# Patient Record
Sex: FEMALE | Race: WHITE | NOT HISPANIC OR LATINO | URBAN - METROPOLITAN AREA
[De-identification: names, ages, dates, MRNs, and addresses within clinical notes are randomized per-mention and may not be internally consistent; named-entity substitution may affect disease eponyms.]

---

## 2017-11-06 ENCOUNTER — ALLSCRIPTS OFFICE VISIT (OUTPATIENT)
Dept: OTHER | Facility: OTHER | Age: 67
End: 2017-11-06

## 2017-11-06 DIAGNOSIS — K13.70 LESION OF ORAL MUCOSA: ICD-10-CM

## 2017-11-06 DIAGNOSIS — L60.2 ONYCHOGRYPHOSIS: ICD-10-CM

## 2017-11-06 DIAGNOSIS — Z00.00 ENCOUNTER FOR GENERAL ADULT MEDICAL EXAMINATION WITHOUT ABNORMAL FINDINGS: ICD-10-CM

## 2017-11-06 DIAGNOSIS — Z98.84 BARIATRIC SURGERY STATUS: ICD-10-CM

## 2017-11-07 ENCOUNTER — TRANSCRIBE ORDERS (OUTPATIENT)
Dept: LAB | Facility: CLINIC | Age: 67
End: 2017-11-07

## 2017-11-07 ENCOUNTER — APPOINTMENT (OUTPATIENT)
Dept: LAB | Facility: CLINIC | Age: 67
End: 2017-11-07

## 2017-11-07 DIAGNOSIS — Z98.84 BARIATRIC SURGERY STATUS: ICD-10-CM

## 2017-11-07 DIAGNOSIS — K13.70 LESION OF ORAL MUCOSA: ICD-10-CM

## 2017-11-07 DIAGNOSIS — Z00.00 ENCOUNTER FOR GENERAL ADULT MEDICAL EXAMINATION WITHOUT ABNORMAL FINDINGS: ICD-10-CM

## 2017-11-07 DIAGNOSIS — L60.2 ONYCHOGRYPHOSIS: ICD-10-CM

## 2017-11-07 LAB
25(OH)D3 SERPL-MCNC: 31.8 NG/ML (ref 30–100)
ALBUMIN SERPL BCP-MCNC: 3.9 G/DL (ref 3.5–5)
ALP SERPL-CCNC: 90 U/L (ref 46–116)
ALT SERPL W P-5'-P-CCNC: 28 U/L (ref 12–78)
ANION GAP SERPL CALCULATED.3IONS-SCNC: 7 MMOL/L (ref 4–13)
AST SERPL W P-5'-P-CCNC: 20 U/L (ref 5–45)
BILIRUB SERPL-MCNC: 0.61 MG/DL (ref 0.2–1)
BUN SERPL-MCNC: 9 MG/DL (ref 5–25)
CALCIUM SERPL-MCNC: 9.1 MG/DL (ref 8.3–10.1)
CHLORIDE SERPL-SCNC: 108 MMOL/L (ref 100–108)
CHOLEST SERPL-MCNC: 189 MG/DL (ref 50–200)
CO2 SERPL-SCNC: 29 MMOL/L (ref 21–32)
CREAT SERPL-MCNC: 0.64 MG/DL (ref 0.6–1.3)
ERYTHROCYTE [DISTWIDTH] IN BLOOD BY AUTOMATED COUNT: 15.1 % (ref 11.6–15.1)
GFR SERPL CREATININE-BSD FRML MDRD: 92 ML/MIN/1.73SQ M
GLUCOSE P FAST SERPL-MCNC: 95 MG/DL (ref 65–99)
HCT VFR BLD AUTO: 38.8 % (ref 34.8–46.1)
HDLC SERPL-MCNC: 65 MG/DL (ref 40–60)
HGB BLD-MCNC: 12.6 G/DL (ref 11.5–15.4)
LDLC SERPL CALC-MCNC: 101 MG/DL (ref 0–100)
MCH RBC QN AUTO: 29.9 PG (ref 26.8–34.3)
MCHC RBC AUTO-ENTMCNC: 32.5 G/DL (ref 31.4–37.4)
MCV RBC AUTO: 92 FL (ref 82–98)
PLATELET # BLD AUTO: 283 THOUSANDS/UL (ref 149–390)
PMV BLD AUTO: 10.5 FL (ref 8.9–12.7)
POTASSIUM SERPL-SCNC: 4.5 MMOL/L (ref 3.5–5.3)
PROT SERPL-MCNC: 7.7 G/DL (ref 6.4–8.2)
RBC # BLD AUTO: 4.22 MILLION/UL (ref 3.81–5.12)
SODIUM SERPL-SCNC: 144 MMOL/L (ref 136–145)
TRIGL SERPL-MCNC: 114 MG/DL
TSH SERPL DL<=0.05 MIU/L-ACNC: 2.14 UIU/ML (ref 0.36–3.74)
VIT B12 SERPL-MCNC: 252 PG/ML (ref 100–900)
WBC # BLD AUTO: 6.09 THOUSAND/UL (ref 4.31–10.16)

## 2017-11-07 PROCEDURE — 85027 COMPLETE CBC AUTOMATED: CPT

## 2017-11-07 PROCEDURE — 80061 LIPID PANEL: CPT

## 2017-11-07 PROCEDURE — 82607 VITAMIN B-12: CPT

## 2017-11-07 PROCEDURE — 82306 VITAMIN D 25 HYDROXY: CPT

## 2017-11-07 PROCEDURE — 83036 HEMOGLOBIN GLYCOSYLATED A1C: CPT

## 2017-11-07 PROCEDURE — 36415 COLL VENOUS BLD VENIPUNCTURE: CPT

## 2017-11-07 PROCEDURE — 84443 ASSAY THYROID STIM HORMONE: CPT

## 2017-11-07 PROCEDURE — 80053 COMPREHEN METABOLIC PANEL: CPT

## 2017-11-08 LAB
EST. AVERAGE GLUCOSE BLD GHB EST-MCNC: 128 MG/DL
HBA1C MFR BLD: 6.1 % (ref 4.2–6.3)

## 2017-11-08 NOTE — PROGRESS NOTES
Assessment  1  Oral lesion (528 9) (K13 70)   2  Nail hypertrophy (703 8) (L60 2)   3  Encounter for preventive health examination (V70 0) (Z00 00)   4  S/P bariatric surgery (V45 86) (Z98 84)    Plan  Health Maintenance, Nail hypertrophy, Oral lesion, S/P bariatric surgery    · (1) COMPREHENSIVE METABOLIC PANEL; Status:Active; Requested NRC:27MVU5441;    · (1) HEMOGLOBIN A1C; Status:Active; Requested BTG:97PDH0574;    · (1) LIPID PANEL FASTING W DIRECT LDL REFLEX; Status:Active; Requested  CJO:99WLJ7972;    · (1) TSH; Status:Active; Requested WUM:46OMR1457;    · (1) VITAMIN B12; Status:Active; Requested FYI:38WCO3545;    · (1) VITAMIN D 25-HYDROXY; Status:Active; Requested JYC:52TDX7010;   Nail hypertrophy, Oral lesion    · (1) CBC/ PLT (NO DIFF); Status:Active; Requested FZI:88AJK1740;     Chief Complaint  Pt here for a lesion on the L side of the inner cheek for a year now, and spreading  History of Present Illness  HM, Adult Female:   Social History: Household members include self  She is   Work status: working full time-- and-- occupation: [employer]  The patient has never smoked cigarettes  She reports rare alcohol use  She has never used illicit drugs  General Health: The patient's health since the last visit is described as fair  She does not have regular dental visits  The patient bridge  -- She complains of vision problems  Vision care includes wearing reading glasses-- and-- an eye examination more than a year ago  Lifestyle:  She consumes a diverse and healthy diet  She is on a diet and is generally adherent  -- She does not exercise regularly  Reproductive health: the patient is postmenopausal--   she reports normal menses  -- she is not sexually active  -- pregnancy history: G P 3    Screening:      Review of Systems    ROS reviewed  Past Medical History  1  History of polycystic ovarian syndrome (V13 29) (Z87 42)  Active Problems And Past Medical History Reviewed:    The active problems and past medical history were reviewed and updated today  Family History  Mother    1  Family history of Diabetes Mellitus (V18 0)  Family History    2  Family history of Diabetes Mellitus (V18 0)   3  Family history of Hepatitis  Family History Reviewed: The family history was reviewed and updated today  Social History   · Denied: History of Alcohol   · Denied: History of Drug Use   · Former smoker (V15 82) (O86 146)  The social history was reviewed and updated today  Surgical History  1  History of Gastric Surgery For Morbid Obesity Bypass With Steph-en-Y  Surgical History Reviewed: The surgical history was reviewed and updated today  Current Meds   1  Co Q 10 10 MG Oral Capsule; take 1 capsule daily; Therapy: (Recorded:06Nov2017) to Recorded   2  Curcumin Powder; USE AS DIRECTED; Therapy: 20LFN0120 to (Last Rx:07Usi5479) Ordered   3  Hair/Skin/Nails TABS; TAKE 1 TABLET DAILY; Therapy: [phone]) to Recorded   4  L-Carnitine 250 MG Oral Tablet; TAKE AS DIRECTED; Therapy: 77EIV4798 to (Last Rx:78Ibd8129) Ordered   5  Multiple Vitamins Oral Tablet; Take 1 daily; Therapy: 19VCS7267 to (Last Rx:00Cwg1355) Ordered   6  Multiple Vitamins Oral Tablet; TAKE 1 TABLET BY MOUTH ONCE DAILY; Therapy: 64KQE7359 to (Last Rx:03Tio8376) Ordered   7  Vitamin D3 2000 UNIT Oral Tablet; Take one tablet daily; Therapy: 50XBJ6927 to (Last Rx:26Xcc8232) Ordered    The medication list was reviewed and updated today  Allergies  1  Fish-derived Products   2  Penicillins   3   [employer] [facility]    Vitals   Recorded: [id number] 01:10PM Recorded: 61IUY4595 77:39JP   Systolic 133    Diastolic 78    Height  5 ft 2 in   Weight  183 lb 6 oz   BMI Calculated  33 54   BSA Calculated  1 84     Signatures   Electronically signed by : Gil Rodriguez DO; Nov 7 8648 11:57AM EST                       (Author)

## 2017-12-11 ENCOUNTER — ALLSCRIPTS OFFICE VISIT (OUTPATIENT)
Dept: OTHER | Facility: OTHER | Age: 67
End: 2017-12-11

## 2017-12-11 DIAGNOSIS — R19.5 OTHER FECAL ABNORMALITIES: ICD-10-CM

## 2017-12-11 DIAGNOSIS — Z12.11 ENCOUNTER FOR SCREENING FOR MALIGNANT NEOPLASM OF COLON: ICD-10-CM

## 2017-12-11 PROCEDURE — G0145 SCR C/V CYTO,THINLAYER,RESCR: HCPCS | Performed by: FAMILY MEDICINE

## 2017-12-12 ENCOUNTER — LAB REQUISITION (OUTPATIENT)
Dept: LAB | Facility: HOSPITAL | Age: 67
End: 2017-12-12
Payer: MEDICARE

## 2017-12-12 DIAGNOSIS — Z01.419 ENCOUNTER FOR GYNECOLOGICAL EXAMINATION WITHOUT ABNORMAL FINDING: ICD-10-CM

## 2017-12-12 DIAGNOSIS — Z78.0 ASYMPTOMATIC MENOPAUSAL STATE: ICD-10-CM

## 2017-12-18 LAB
LAB AP GYN PRIMARY INTERPRETATION: NORMAL
Lab: NORMAL

## 2017-12-28 ENCOUNTER — APPOINTMENT (OUTPATIENT)
Dept: LAB | Facility: CLINIC | Age: 67
End: 2017-12-28
Payer: MEDICARE

## 2017-12-28 ENCOUNTER — TRANSCRIBE ORDERS (OUTPATIENT)
Dept: LAB | Facility: CLINIC | Age: 67
End: 2017-12-28

## 2017-12-28 DIAGNOSIS — Z12.11 ENCOUNTER FOR SCREENING FOR MALIGNANT NEOPLASM OF COLON: ICD-10-CM

## 2017-12-28 LAB — HEMOCCULT STL QL IA: POSITIVE

## 2017-12-28 PROCEDURE — G0328 FECAL BLOOD SCRN IMMUNOASSAY: HCPCS

## 2018-01-15 VITALS
SYSTOLIC BLOOD PRESSURE: 140 MMHG | WEIGHT: 183.38 LBS | HEIGHT: 62 IN | BODY MASS INDEX: 33.75 KG/M2 | DIASTOLIC BLOOD PRESSURE: 78 MMHG

## 2018-01-23 VITALS
HEIGHT: 61 IN | DIASTOLIC BLOOD PRESSURE: 72 MMHG | BODY MASS INDEX: 35.14 KG/M2 | WEIGHT: 186.13 LBS | SYSTOLIC BLOOD PRESSURE: 112 MMHG

## 2018-01-23 NOTE — PROGRESS NOTES
Assessment    1  Screen for colon cancer (V76 51) (Z12 11)   2  Encounter for preventive health examination (V70 0) (Z00 00)   3  Pre-diabetes (790 29) (R73 03)   4  S/P bariatric surgery (V45 86) (Z98 84)   5  Postmenopausal (V49 81) (Z78 0)   6  Encounter for gynecological examination with Papanicolaou smear of cervix (V72 31)   (Z01 419)    Plan  Encounter for gynecological examination with Papanicolaou smear of cervix,  Postmenopausal    · (1) THIN PREP PAP WITH IMAGING; Status: In Progress - Specimen/Data Collected;    Done: 06FCV9571  Maturation index required? : No  : Postmenopausal  HPV? : if ASCUS  Pre-diabetes    · Begin or continue regular aerobic exercise  Gradually work up to at least 3 sessions of 30  minutes of exercise a week ; Status:Complete;   Done: 83BGP7004   · Continue with our present treatment plan ; Status:Complete;   Done: 60IAD0852   · We want you to follow the Therapeutic Lifestyle Changes (TLC) diet ; Status:Complete;    Done: 94RJC6043   · Follow-up visit in 1 year Evaluation and Treatment  Follow-up  Status: Complete  Done:  65LIO1020  Screen for colon cancer    · (1) OCCULT BLOOD, FECAL IMMUNOCHEMICAL TEST; Status:Active; Requested  for:84Hvp1726;     Discussion/Summary  Impression: Initial Annual Wellness Visit, with preventive exam as well as age and risk appropriate counseling completed  Cardiovascular screening and counseling: screening is current  Diabetes screening and counseling: screening is current  Colorectal cancer screening and counseling: due for fecal occult blood testing and fobt kit given  Breast cancer screening and counseling: the patient declines screening  Cervical cancer screening and counseling: screening is current  Osteoporosis screening and counseling: the patient declines screening  Abdominal aortic aneurysm screening and counseling: the risks and benefits of screening were discussed  Glaucoma screening and counseling: screening is current  Hepatitis C Screening: the patient was counseled on Hepatitis C screening  The patient declines Hepatitis C screening  Immunizations: the patient declines the influenza vaccination, the patient declines the pneumococcal vaccination, the patient declines the Zostavax vaccine and the patient declines the Tdap vaccine  Advance Directive Planning: complete and up to date  Patient Discussion: plan discussed with the patient, follow-up visit needed in one year  Chief Complaint  Medicare wellness and PAP      History of Present Illness  HPI: Patient is here for Medicare wellness as well as gyn exam   Lab work is reviewed and scanned into the chart all is within normal except for low vitamin B12  She needs to  new B12 supplementation  She also has a hemoglobin A1c of 6 1 percent this is improved from pre gastric bypass surgery of 6 9 percent  Welcome to [employer] and Wellness Visits: The patient is being seen for the initial annual wellness visit  Medicare Screening and Risk Factors   Hospitalizations: no previous hospitalizations  Medicare Screening Tests Risk Questions   Drug and Alcohol Use: The patient has never smoked cigarettes and has never used smokeless tobacco  The patient reports rare alcohol use  Alcohol concern:   The patient has no concerns about alcohol abuse  She has never used illicit drugs  Diet and Physical Activity: Current diet includes well balanced meals and low carbohydrate food choices  She exercises daily  Mood Disorder and Cognitive Impairment Screening: She denies feeling down, depressed, or hopeless over the past two weeks  She denies feeling little interest or pleasure in doing things over the past two weeks     Cognitive impairment screening: denies difficulty learning/retaining new information, denies difficulty handling complex tasks, denies difficulty with reasoning, denies difficulty with spatial ability and orientation, denies difficulty with language and denies difficulty with behavior  Functional Ability/Level of Safety: Hearing is normal bilaterally, normal in the right ear, normal in the left ear and Tinnitus  She denies hearing difficulties  She does not use a hearing aid  Activities of daily living details: does not need help using the phone, no transportation help needed, does not need help shopping, no meal preparation help needed, does not need help doing housework, does not need help doing laundry, does not need help managing medications and does not need help managing money  Fall risk factors: The patient fell 0 times in the past 12 months  Home safety risk factors:  loose rugs, but no unfamiliar surroundings, no poor household lighting, no uneven floors, no household clutter, grab bars in the bathroom and handrails on the stairs  Advance Directives: Advance directives: living will, durable power of  for health care directives and advance directives  Co-Managers and Medical Equipment/Suppliers: See Patient Care Team   Preventive Quality Program 72 and Older: Urinary Incontinence Symptoms includes: urinary incontinence    Date of last glaucoma screen was 2-3 years ago      Review of Systems    Constitutional: no fever  Eyes: no eye pain  ENT: no earache and no sneezing  Cardiovascular: the heart is not racing  Respiratory: no shortness of breath  Gastrointestinal: no abdominal pain and no abdominal bloating  Genitourinary: no urinary frequency  Integumentary and Breasts: no rashes  Neurological: no headache  Psychiatric: no insomnia  Active Problems    1  Pre-diabetes (790 29) (R73 03)   2  S/P bariatric surgery (V45 86) (S6 76)    Past Medical History    · History of polycystic ovarian syndrome (V13 29) (Z79 42)    The active problems and past medical history were reviewed and updated today        Surgical History    · History of Gastric Surgery For Morbid Obesity Bypass With Steph-en-Y    The surgical history was reviewed and updated today  Family History  Mother    · Family history of Diabetes Mellitus (V18 0)  Family History    · Family history of Diabetes Mellitus (V18 0)   · Family history of Hepatitis    The family history was reviewed and updated today  Social History    · Denied: History of Alcohol   · Denied: History of Drug Use   · Former smoker (V15 82) (Y02 749)  The social history was reviewed and updated today  Current Meds   1  Co Q 10 10 MG Oral Capsule; take 1 capsule daily; Therapy: (Recorded:06Nov2017) to Recorded   2  Curcumin Powder; USE AS DIRECTED; Therapy: 22LWI3810 to (Last Rx:51Bno4876) Ordered   3  Hair/Skin/Nails TABS; TAKE 1 TABLET DAILY; Therapy: [phone]) to Recorded   4  L-Carnitine 250 MG Oral Tablet; TAKE AS DIRECTED; Therapy: 82HUK9279 to (Last Rx:12Cgu3523) Ordered   5  Multiple Vitamins Oral Tablet; Take 1 daily; Therapy: 97DXX9428 to (Last Rx:20Jfd3238) Ordered   6  Multiple Vitamins Oral Tablet; TAKE 1 TABLET BY MOUTH ONCE DAILY; Therapy: 55UDE6317 to (Last Rx:06Ppm0632) Ordered   7  Vitamin D3 2000 UNIT Oral Tablet; Take one tablet daily; Therapy: 46TBK4879 to (Last Rx:42Ril8326) Ordered    The medication list was reviewed and updated today  Allergies    1  Fish-derived Products   2  Penicillins   3  [employer]    Vitals  Signs    Systolic: 978  Diastolic: 72   Height: 5 ft 1 in  Weight: 186 lb 2 oz  BMI Calculated: 35 17  BSA Calculated: 1 83    Physical Exam    Constitutional   General appearance: No acute distress, well appearing and well nourished  well developed, appears healthy, obese and appearance reflects stated age  Eyes   Pupils and irises: Equal, round, reactive to light  Ophthalmoscopic examination: Normal fundi and optic discs  Ears, Nose, Mouth, and Throat   Otoscopic examination: Tympanic membranes translucent with normal light reflex  Canals patent without erythema      Nasal mucosa, septum, and turbinates: Normal without edema or erythema  Lips, teeth, and gums: Normal, good dentition  Oropharynx: Normal with no erythema, edema, exudate or lesions  Neck   Thyroid: Normal, no thyromegaly  Pulmonary   Auscultation of lungs: Clear to auscultation  Cardiovascular   Auscultation of heart: Normal rate and rhythm, normal S1 and S2, no murmurs  Peripheral vascular exam: Normal     Abdomen   Abdomen: Non-tender, no masses  Liver and spleen: No hepatomegaly or splenomegaly  Genitourinary   External genitalia and vagina: Normal, no lesions appreciated  External genitalia: vulvar atrophy  Urethra: Normal, no discharge  Cervix: Normal, no lesions  A Pap smear was performed  Musculoskeletal   Gait and station: Normal     Skin   Skin and subcutaneous tissue: Normal without rashes or lesions  Neurologic   Reflexes: 2+ and symmetric  Sensation: No sensory loss  Psychiatric   Orientation to person, place, and time: Normal     Mood and affect: Normal        Results/Data  (1) LIPID PANEL FASTING W DIRECT LDL REFLEX 72HCU8088 09:39QA Sean Hicks Order Number: [id number]     Test Name Result Flag Reference   CHOLESTEROL 189 mg/dL     LDL CHOLESTEROL CALCULATED 101 mg/dL H 0-100   Triglyceride:        Normal <150 mg/dl   Borderline High 150-199 mg/dl   High 200-499 mg/dl   Very High >499 mg/dl      Cholesterol:       Desirable <200 mg/dl    Borderline High 200-239 mg/dl    High >239 mg/dl      HDL Cholesterol:       High>59 mg/dL    Low <41 mg/dL      HDL Cholesterol:       High>59 mg/dL    Low <41 mg/dL      This screening LDL is a calculated result  It does not have the accuracy of the Direct Measured LDL in the monitoring of patients with hyperlipidemia and/or statin therapy  Direct Measure LDL (XMQ851) must be ordered separately in these patients     TRIGLYCERIDES 114 mg/dL  <=150   Specimen collection should occur prior to N-Acetylcysteine or Metamizole administration due to the potential for falsely depressed results  HDL,DIRECT 65 mg/dL H 40-60   Specimen collection should occur prior to Metamizole administration due to the potential for falsley depressed results  (1) TSH 01SSM6390 36:91ZT Markus Emerald Order Number: [id number]     Test Name Result Flag Reference   TSH 2 140 uIU/mL  0 358-3 740   Patients undergoing fluorescein dye angiography may retain small amounts of fluorescein in the body for 48-72 hours post procedure  Samples containing fluorescein can produce falsely depressed TSH values  If the patient had this procedure,a specimen should be resubmitted post fluorescein clearance  The recommended reference ranges for TSH during pregnancy are as follows:  First trimester 0 1 to 2 5 uIU/mL  Second trimester  0 2 to 3 0 uIU/mL  Third trimester 0 3 to 3 0 uIU/m     (1) COMPREHENSIVE METABOLIC PANEL 59HYI1593 31:16CR Markus Emerald Order Number: [id number]     Test Name Result Flag Reference   SODIUM 144 mmol/L  136-145   POTASSIUM 4 5 mmol/L  3 5-5 3   CHLORIDE 108 mmol/L  100-108   CARBON DIOXIDE 29 mmol/L  21-32   ANION GAP (CALC) 7 mmol/L  4-13   BLOOD UREA NITROGEN 9 mg/dL  5-25   CREATININE 0 64 mg/dL  0 60-1 30   Standardized to IDMS reference method   CALCIUM 9 1 mg/dL  8 3-10 1   BILI, TOTAL 0 61 mg/dL  0 20-1 00   ALK PHOSPHATAS 90 U/L     ALT (SGPT) 28 U/L  12-78   Specimen collection should occur prior to Sulfasalazine and/or Sulfapyridine administration due to the potential for falsely depressed results  AST(SGOT) 20 U/L  5-45   Specimen collection should occur prior to Sulfasalazine administration due to the potential for falsely depressed results     ALBUMIN 3 9 g/dL  3 5-5 0   TOTAL PROTEIN 7 7 g/dL  6 4-8 2   eGFR 92 ml/min/1 73sq m     National Kidney Disease Education Program recommendations are as follows:  GFR calculation is accurate only with a steady state creatinine  Chronic Kidney disease less than 60 ml/min/1 73 sq  meters  Kidney failure less than 15 ml/min/1 73 sq  meters  GLUCOSE FASTING 95 mg/dL  65-99   Specimen collection should occur prior to Sulfasalazine administration due to the potential for falsely depressed results  Specimen collection should occur prior to Sulfapyridine administration due to the potential for falsely elevated results  (1) HEMOGLOBIN A1C 86EGA5552 71:80AG mana[facility] Order Number: [id number]     Test Name Result Flag Reference   HEMOGLOBIN A1C 6 1 %  4 2-6 3   EST  AVG  GLUCOSE 128 mg/dl       (1) VITAMIN B12 77GMX5394 71:35CT [facility] Order Number: [id number]     Test Name Result Flag Reference   VITAMIN B12 252 pg/mL  100-900     (1) VITAMIN D 25-HYDROXY 83KFK5942 80:61PT [facility] Order Number: [id number]     Test Name Result Flag Reference   VIT D 25-HYDROX 31 8 ng/mL  30 0-100 0   This assay is a certified procedure of the CDC Vitamin D Standardization Certification Program (VDSCP)     Deficiency <20ng/ml   Insufficiency 20-30ng/ml   Sufficient  ng/ml     *Patients undergoing fluorescein dye angiography may retain small amounts of fluorescein in the body for 48-72 hours post procedure  Samples containing fluorescein can produce falsely elevated Vitamin D values  If the patient had this procedure, a specimen should be resubmitted post fluorescein clearance  (1) CBC/ PLT (NO DIFF) 83TOQ8938 70:49FX [facility] Order Number: [id number]     Test Name Result Flag Reference   HEMATOCRIT 38 8 %  34 8-46 1   HEMOGLOBIN 12 6 g/dL  11 5-15 4   MCHC 32 5 g/dL  31 4-37 4   MCH 29 9 pg  26 8-34 3   MCV 92 fL  82-98   PLATELET COUNT 305 Thousands/uL  149-390   RBC COUNT 4 22 Million/uL  3 81-5 12   RDW 15 1 %  11 6-15 1   WBC COUNT 6 09 Thousand/uL  4 31-10 16   MPV 10 5 fL  8 9-12 7     Health Management  Health Maintenance   COLONOSCOPY; every 10 years;  Next Due: 62Pyp0277; Overdue    Signatures   Electronically signed by : Isaak Gamboa DO; Dec 11 2971 11:57AM EST                       (Author)

## 2022-01-24 ENCOUNTER — OFFICE VISIT (OUTPATIENT)
Dept: DENTISTRY | Facility: CLINIC | Age: 72
End: 2022-01-24

## 2022-01-24 VITALS — SYSTOLIC BLOOD PRESSURE: 154 MMHG | DIASTOLIC BLOOD PRESSURE: 69 MMHG | TEMPERATURE: 98.9 F | HEART RATE: 90 BPM

## 2022-01-24 DIAGNOSIS — Z01.21 ENCOUNTER FOR DENTAL EXAMINATION AND CLEANING WITH ABNORMAL FINDINGS: Primary | ICD-10-CM

## 2022-01-24 PROCEDURE — D0210 INTRAORAL - COMPLETE SERIES OF RADIOGRAPHIC IMAGES: HCPCS | Performed by: DENTAL HYGIENIST

## 2022-01-24 PROCEDURE — D0150 COMPREHENSIVE ORAL EVALUATION - NEW OR ESTABLISHED PATIENT: HCPCS | Performed by: DENTAL HYGIENIST

## 2022-01-25 NOTE — PROGRESS NOTES
New Patient Exam     Exams:  Comprehensive exam and perio charted  Xrays:     FMX    Type of Treatment:     Reviewed OHI  Brush:  2X/day and Floss 1X/day  EO/OCS Exams:  No significant findings  IO: No significant findings  Oral Hygiene:  Good / Fair    Plaque:  Light    Calculus:  Light  / Moderate    Bleeding:  Light  / Moderate    Gingiva:   Red  / Spongy /  Inflamed  Stain:  none  Perio Charting:  Periocharting was completed and evaluated  2- 9 mm w/  Lt - mod BUP  ;  Severe bone loss on several teeth; Mobility noted 1- 3 degrees  ;  SRP's recommended - 4 quads - will re-evaluate with Dr Mariana Alfonso after SRP's completed    Perio Findings:  Chronic Mild to Severe Periodontitis  Caries Findings:  Decayed and broken down teeth - Ext's needed on:  12 , 14, 17, 19, 31  Caries Risk Assessment:   Moderate caries risk    Treatment Plan:  Updated    Dr  Exam:  Dr Ortiz Loza  Referral:  No referral given  Nv: Ext #12, 14, 17,19, 31  60 mins  Nv2:  SRP's UR/LR - 90min  Nv3:  SRP's UL/LL - 90min

## 2022-06-13 ENCOUNTER — OFFICE VISIT (OUTPATIENT)
Dept: DENTISTRY | Facility: CLINIC | Age: 72
End: 2022-06-13

## 2022-06-13 VITALS — DIASTOLIC BLOOD PRESSURE: 69 MMHG | HEART RATE: 83 BPM | SYSTOLIC BLOOD PRESSURE: 183 MMHG | TEMPERATURE: 98 F

## 2022-06-13 DIAGNOSIS — K04.8 DENTAL CYST: Primary | ICD-10-CM

## 2022-06-13 PROCEDURE — D0191 ASSESSMENT OF A PATIENT: HCPCS | Performed by: DENTIST

## 2022-06-13 NOTE — PROGRESS NOTES
PPTC for originally planned extractions  Pt's BP today was ~180/85, took BP several times with no change  Pt states she has not been to a PCP doctor in over 5 years  Informed pt she has uncontrolled HTN and recommend that she see a primary care doctor for her overall health conditions and needs  Emphasized her BP is uncontrolled and the effects high BP can have systemically  Pt understands and will f/u with PCP for BP  Evaluated PA of UL region with Dr Loraine Acevedo - significant findings include non-restorable #12, significant bone loss around implant #12, and grade 3 mobility with #14  Recommend extraction of #12, 13 (failing implant), #14, #19, #31  Due to complexity of ext #14 (with sinus proximity) and pt's uncontrolled HTN, recommend referring pt to [facility] for extractions  [facility] referral printed and given to pt      NV: ScRP (eval UL region after extractions)

## 2022-07-29 ENCOUNTER — OFFICE VISIT (OUTPATIENT)
Dept: DENTISTRY | Facility: CLINIC | Age: 72
End: 2022-07-29

## 2022-07-29 VITALS — DIASTOLIC BLOOD PRESSURE: 75 MMHG | SYSTOLIC BLOOD PRESSURE: 179 MMHG | TEMPERATURE: 98 F | HEART RATE: 77 BPM

## 2022-07-29 DIAGNOSIS — Z01.20 ENCOUNTER FOR DENTAL EXAMINATION: ICD-10-CM

## 2022-07-29 DIAGNOSIS — K05.6 PERIODONTAL DISEASE: Primary | ICD-10-CM

## 2022-07-29 PROBLEM — K13.70 ORAL LESION: Status: ACTIVE | Noted: 2017-11-06

## 2022-07-29 PROBLEM — L60.2 NAIL HYPERTROPHY: Status: ACTIVE | Noted: 2017-11-06

## 2022-07-29 PROBLEM — R19.5 FECAL OCCULT BLOOD TEST POSITIVE: Status: ACTIVE | Noted: 2018-01-04

## 2022-07-29 PROBLEM — R73.03 PRE-DIABETES: Status: ACTIVE | Noted: 2017-11-22

## 2022-07-29 NOTE — PROGRESS NOTES
SCALING AND ROOT PLANING     ASA: 2  Pain: 0  Reviewed M/H  Patient not taking any RX meds only supplements    Patient states her BP  Diastolic is always under 80      SC/RP:       /LL   Quad scaling and root planning  Applied Topical Anesthetic,   Administered    ____1 5_ carpule , Short Needle,  Septocaine (articaine HCI ) and Epi 4 % and  1:100,000 1 7 mL, Infiltration, IANB   Type of Treatment:  Used Ultrasonic and Hand Scaling, Flossed, Polished, Subgingival Irrigation - post tx - Chlorhexidine  Reviewed OHI  - Brush 2X/day and Floss 1X/day  Oral Hygiene:  good  Plaque:  Light- Moderate  Calculus:  Light- Moderate   Bleeding: Moderate  Stain:   / Light      Treatment Plan:changes to tx plan     Dr Suzanne Elizalde felt since she has now had 12,13,14 extracted we can include 10,11 when we do R side           Finances are an issue and she hoped to not have to pay for UL quad since she was missing the teeth in that quad    Dr  Exam:  Resident Dr Suzanne Elizalde   gave anesthesia today                 Referral:  No referral given  Gave follow-up directions    NV1:  SC/RP - U/L R - 75 min  NV2:  post SRP eval - 45  NV3:  3 month perio maintenance - 60 min    Patient went to her previous DDS to have # 31, 1,2 14 eztracted and stated implant # 13 fell out on its own  She DID NOT have # 23 or 17 extracted  She kept hoping # 19 could be restored, but Dr Suzanne Elizalde ans I explained it was non restorable    Patient plans on returning to her DDS for upper RPD   Patient tolerated procedure well    NV SRP right side plus 10,11

## 2022-08-26 ENCOUNTER — OFFICE VISIT (OUTPATIENT)
Dept: DENTISTRY | Facility: CLINIC | Age: 72
End: 2022-08-26

## 2022-08-26 VITALS — HEART RATE: 94 BPM | DIASTOLIC BLOOD PRESSURE: 82 MMHG | TEMPERATURE: 97 F | SYSTOLIC BLOOD PRESSURE: 161 MMHG

## 2022-08-26 DIAGNOSIS — K03.6 ACCRETIONS ON TEETH: ICD-10-CM

## 2022-08-26 DIAGNOSIS — K03.6 DENTAL CALCULUS: ICD-10-CM

## 2022-08-26 DIAGNOSIS — Z01.20 ENCOUNTER FOR DENTAL EXAMINATION: ICD-10-CM

## 2022-08-26 DIAGNOSIS — K04.7 DENTAL ABSCESS: ICD-10-CM

## 2022-08-26 DIAGNOSIS — K05.6 PERIODONTAL DISEASE: Primary | ICD-10-CM

## 2022-08-26 PROCEDURE — D0230 INTRAORAL - PERIAPICAL EACH ADDITIONAL RADIOGRAPHIC IMAGE: HCPCS

## 2022-08-26 PROCEDURE — D0220 INTRAORAL - PERIAPICAL FIRST RADIOGRAPHIC IMAGE: HCPCS

## 2022-08-26 PROCEDURE — D4342 PERIODONTAL SCALING AND ROOT PLANING - 1 TO 3 TEETH PER QUADRANT: HCPCS

## 2022-08-26 NOTE — PROGRESS NOTES
SCALING AND ROOT PLANING     ASA: 2  Pain: O  Reviewed M/H  PATIENT ONLY TAKING HERBAL AND OTC SUPPLEMENTS    STATES SHE REGULARLY CHECKS HER BP AT HOME AND" DIASTOLIC IS ALWAYS UNDER 80"    SC/RP:   UR/LR   #   Quad scaling and root planing # 10,11  Applied Topical Anesthetic,   Administered  ___1 carpules,  Lidocaine HCL 2 % and Epi 1 7 mL 1:100,000,  MANDIBULAR TEETH  ___1__ carpule , Short Needle,  Septocaine (articaine HCI ) and Epi 4 % and  1:100,000 1 7 mL, MAXILLARY TEETH  Type of Treatment:  Used Ultrasonic and Hand Scaling, Flossed,  Subgingival Irrigation - post tx - Chlorhexidine  Reviewed OHI  - Brush 2X/day and Floss 1X/day  Murray TAKEN UR AND UL    SHE HAD TEETH EXTRACTED UL AND STILL FELT SORE-- REQUESTED AREA BE EVALUATED TO SEE IF THERE WAS ANY INFECTION OR RESIDUAL TOOTH ISSUE    DURING SCALING, FISTULA WAS NOTED ABOVE # 6-7 AREA  I PUSHED ON # 6 AND DRAINAGE EVIDENT patient was numb so we could not fully evaluate tooth    Oral Hygiene:  Good-fair  Plaque:  light  Calculus: light  Moderate   Bleeding:    Heavy    Stain:  None     Treatment Plan: patient plans on having upper RPD made by her other dentist  Dr Hansa Gomes suggested she make sure they make a denture that teeth can be added to   Stressed she has advanced bone loss and mobility of her teeth    Patient needs to return to have # 6,7 evaluated  PAX were taken today  We could not test tooth because she was numb from the SRP anesthesia    Dr  Exam:  Resident Dr Yulia Gaspar     gave anesthesia today                   Referral:  No referral given  Gave follow-up directions  NV1  eval PAP # 6-7  NV2:  post SRP eval - 45 min periodic exam  NV3:  3 month perio maintenance - 60 min    I stressed with patient that she should see MD PCP for blood work to evaluate insulin levels   Patient has" not seen MD in 5 years" but was considering it    She is losing her current insurance soon

## 2022-10-06 ENCOUNTER — OFFICE VISIT (OUTPATIENT)
Dept: DENTISTRY | Facility: CLINIC | Age: 72
End: 2022-10-06

## 2022-10-06 VITALS — TEMPERATURE: 98 F | HEART RATE: 72 BPM | DIASTOLIC BLOOD PRESSURE: 75 MMHG | SYSTOLIC BLOOD PRESSURE: 149 MMHG

## 2022-10-06 DIAGNOSIS — Z01.20 ENCOUNTER FOR DENTAL EXAMINATION: Primary | ICD-10-CM

## 2022-10-06 NOTE — PROGRESS NOTES
Dental procedures in this visit     - PERIODIC ORAL EVALUATION - ESTABLISHED PATIENT (Completed)     SRP FOLLOW UP   PROBE     Service provider: Viola Avila DDS     Billing provider: Emily Chatman DMD     Subjective   Patient ID: Tonie Gaytan is a 66 y o  female  Chief Complaint   Patient presents with    Periodic Exam     SRP eval     ASA  II  Pain - 0  Reviewed M/DH    Pt presented for Exam, post SRP eval, FMP  Pt's numbers have increased or reduced 1-2 mm in most areas  Tooth #6-7 has a possible infection - needs a consult with Dr Mickie Ba  Pt is not having pain at this time  Pt was informed that tooth #19 has a limited prognosis due to furcation involvement, decay, and bone loss  Pt states she wants to wait as long as possible before extracting the tooth      Exam:  Dr Benoit Alvarado and Dr Lucero Church  Referral:  None    NV1:  Consult w/ Dr Mickie Ba about 6-7 - 30 min  NV2:   Periodontal maintenance - 60 min - 3 months after 8/26/22

## 2022-10-11 ENCOUNTER — OFFICE VISIT (OUTPATIENT)
Dept: DENTISTRY | Facility: CLINIC | Age: 72
End: 2022-10-11

## 2022-10-11 VITALS — HEART RATE: 73 BPM | DIASTOLIC BLOOD PRESSURE: 82 MMHG | SYSTOLIC BLOOD PRESSURE: 185 MMHG | TEMPERATURE: 98.4 F

## 2022-10-11 DIAGNOSIS — K05.6 PERIODONTAL DISEASE: Primary | ICD-10-CM

## 2022-10-11 DIAGNOSIS — K04.7 DENTAL ABSCESS: ICD-10-CM

## 2022-10-11 PROCEDURE — D0191 ASSESSMENT OF A PATIENT: HCPCS | Performed by: DENTIST

## 2022-10-11 RX ORDER — CLINDAMYCIN HYDROCHLORIDE 300 MG/1
300 CAPSULE ORAL 3 TIMES DAILY
Qty: 21 CAPSULE | Refills: 0 | Status: SHIPPED | OUTPATIENT
Start: 2022-10-11 | End: 2022-10-17

## 2022-10-12 NOTE — PROGRESS NOTES
Assesment #6, 7    Nikki Padilla is a 78yo  female that presented to clinic for assessment of #6, 7 per Dr Bonnielee Riedel during hygiene exam (see 08/26/22 note)  Pt denies having any pain  Pt also expresses desire to keep all teeth  PMHR, no changes  Significant findings include:  · Allergies: PCN, Latex, fish  · DM    ASA II  Pain = none  BP = 185/82mmHg (when asked, pt denied taking BP meds and suggested white coat syndrome stating that BP is around 262 systolic at home)  Translation = none    Clinical:   #6: Percussion (-); Cold (-); EPT= 80  #7: Percussion (-); Cold (-); EPT = 80  #23-25: Percussion (-); Cold (+) 2/10 pain (2sec); EPT = 33       Probing depths around #6 and #7 were within 2-3mm  Pt presented with patent fistula with very little drainage  Fistula was positioned on facial gingiva of #6  GP cone was inserted to trace fistula to tooth source  Radiographic: PA taken and GP trace revealed RCT #6 as source of infection  This tooth appears to have a screw retained post not well adapted as it is surrounded by GP instead of cement or tooth structure  There is also a lucency between post tip and GP  Dg: chronic periodontal abscess #6    RX: [id number] (d/t PCN allergy   Pt denied any GI issues), Endo referral    TxP:  Refer to endodontist for retreat #6 and other specialists prn, perio maintenance therapy    NV: 3mo perio maint

## 2022-10-14 ENCOUNTER — TELEPHONE (OUTPATIENT)
Dept: FAMILY MEDICINE CLINIC | Facility: CLINIC | Age: 72
End: 2022-10-14

## 2022-10-14 NOTE — TELEPHONE ENCOUNTER
Anniece Robinson called the office in [facility] to she was a patient of Dr Jeffries and it has been some time since was has been seen  She believes she is having a possible allergic reaction to the antibiotic the dentist prescribed  I informed Jos eMaria Houser as it has been more than 3 years she is considered no longer our pt  We are unable to give any medical advice until she is seen and re established in our office  I did offer her the next available appointment for Monday 10/17/22 to reestablish  Pt has bumps on her belly  Pt was advised to please contact the prescribing physician of the antibiotic for instructions on how to proceed  I did apologize to pt for the inconvenience  She did express verbal understating

## 2022-10-17 ENCOUNTER — OFFICE VISIT (OUTPATIENT)
Dept: FAMILY MEDICINE CLINIC | Facility: CLINIC | Age: 72
End: 2022-10-17
Payer: MEDICARE

## 2022-10-17 VITALS
WEIGHT: 213 LBS | DIASTOLIC BLOOD PRESSURE: 88 MMHG | HEART RATE: 69 BPM | OXYGEN SATURATION: 97 % | SYSTOLIC BLOOD PRESSURE: 182 MMHG | TEMPERATURE: 96.4 F | HEIGHT: 61 IN | BODY MASS INDEX: 40.22 KG/M2

## 2022-10-17 DIAGNOSIS — Z13.220 SCREENING CHOLESTEROL LEVEL: ICD-10-CM

## 2022-10-17 DIAGNOSIS — E66.01 MORBID OBESITY (HCC): ICD-10-CM

## 2022-10-17 DIAGNOSIS — K04.7 DENTAL INFECTION: ICD-10-CM

## 2022-10-17 DIAGNOSIS — R73.03 PRE-DIABETES: Primary | ICD-10-CM

## 2022-10-17 DIAGNOSIS — E53.8 LOW VITAMIN B12 LEVEL: ICD-10-CM

## 2022-10-17 PROCEDURE — 99214 OFFICE O/P EST MOD 30 MIN: CPT | Performed by: NURSE PRACTITIONER

## 2022-10-17 RX ORDER — AZITHROMYCIN 250 MG/1
TABLET, FILM COATED ORAL
Qty: 6 TABLET | Refills: 0 | Status: SHIPPED | OUTPATIENT
Start: 2022-10-17 | End: 2022-10-21

## 2022-10-17 RX ORDER — MULTIVIT-MIN/IRON/FOLIC/HRB186 3.3 MG-25
1 TABLET ORAL DAILY
COMMUNITY

## 2022-10-17 RX ORDER — ASCORBIC ACID 1000 MG
1 TABLET ORAL DAILY
COMMUNITY

## 2022-10-17 RX ORDER — CURCUMIN 100 %
POWDER (GRAM) MISCELLANEOUS
COMMUNITY
Start: 2013-12-17

## 2022-10-17 RX ORDER — LEVOCARNITINE 250 MG
TABLET ORAL
COMMUNITY
Start: 2013-12-17

## 2022-10-17 NOTE — PROGRESS NOTES
Assessment/Plan:   Diagnosis ICD-10-CM Associated Orders   1  Pre-diabetes  R73 03 TSH, 3rd generation with Free T4 reflex     Hemoglobin A1C   2  Screening cholesterol level  Z13 220 Lipid panel   3  Dental infection  K04 7 CBC and differential     Comprehensive metabolic panel     azithromycin (ZITHROMAX) 250 mg tablet   4  Morbid obesity (HCC)  E66 01 Lipid panel     CBC and differential     Comprehensive metabolic panel     TSH, 3rd generation with Free T4 reflex     Hemoglobin A1C     Vitamin B12   5  Low vitamin B12 level  E53 8 Vitamin B12   Offered oral steroid but patient states she is comfortable just using topical agents for symptom relief  Will remain off of Clindamycin and added as allergy  Once rash resolved (which is improving) then start azithromycin which is used as secondary option for dental abscess  Offered allergy testing since she has questions about her penicillin allergy and stated she wanted testing but upon further discussion,  patient declined  Declined all health maintenance items today and wants to defer until she sees Dr Aleks Heck  Labs ordered to be done prior to that appointment for review  Offered to provide hydralazine PRN for BP prior to dental appointments but she refuses and will discuss with Dr Bazzi Da  Spent >30 minutes with patient > 50% time spent counseling,   Advised to call the office for any worsening of symptoms or no symptom improvement  Patient verbalizes understand and agrees with treatment plan  Diagnoses and all orders for this visit:    Pre-diabetes  -     TSH, 3rd generation with Free T4 reflex; Future  -     Hemoglobin A1C; Future    Screening cholesterol level  -     Lipid panel; Future    Dental infection  -     CBC and differential; Future  -     Comprehensive metabolic panel; Future  -     azithromycin (ZITHROMAX) 250 mg tablet; Take 2 tablets today then 1 tablet daily x 4 days    Morbid obesity ([address] )  -     Lipid panel;  Future  -     CBC and differential; Future  -     Comprehensive metabolic panel; Future  -     TSH, 3rd generation with Free T4 reflex; Future  -     Hemoglobin A1C; Future  -     Vitamin B12; Future    Low vitamin B12 level  -     Vitamin B12; Future    Other orders  -     Coenzyme Q10 (Co Q 10) 10 MG CAPS; Take 1 capsule by mouth daily  -     Multiple Vitamins-Minerals (Hair Skin & Nails Advanced) TABS; Take 1 tablet by mouth daily  -     Turmeric, Curcuma Longa, (Curcumin) POWD; Use  -     Cholecalciferol 50 MCG (2000 UT) CAPS; Take 1 tablet by mouth daily  -     MULTIPLE VITAMINS PO; Take by mouth daily  -     levOCARNitine (L-Carnitine) 250 MG TABS; Take by mouth  -     VITAMIN E PO; Take by mouth  -     CINNAMON PO; Take by mouth              Subjective:        Patient ID: Leticia Camacho is a 67 y o  female  Chief Complaint   Patient presents with   • New Patient Visit     Re establish care, possible allergic reaction to abx given at [facility] has rash all over abdomen reaction started on 3rd day of abx, elevated Blood pressures she takes it at home at they have been good, in the office they are high, and discuss pre diabetes   Has an upcoming appt with Dr Rafael Head on 10/25 is here for UC follow today        Patient presents to office to re-establish care as new patient  She will be following with Dr Rafael Head has her PCP  He made this appointment earlier due to an allergic reaction to an antibiotic  Patient declines her routine screenings to be addressed today  She only wants to focus on the antibiotic concern  She has been following at [facility]  She currently has dental abscess  They gave her clindamycin 10/11 for this to be treated and to make appointment for root canal with specialist  She is working on getting that appointment  3 days into the clindamycin she started with a full body diffuse pruritic rash  She stopped taking the clindamycin  She was having a sore throat that resolved   The rash has been improving since she stopped the antibiotic  Rash mainly on her abdomen  She is using topical benadryl cream PRN which is helpful  She did notify the dental office about the allergic reaction but hasn't heard back from them  Her BP is great at home but always elevated in office  Her BP today at home was 130/77  Typical readings 120s/60s  Asymptomatic  She states dental work has been held prior due to elevated BP  The following portions of the patient's history were reviewed and updated as appropriate: allergies, current medications, past family history, past social history and problem list     Review of Systems   Constitutional: Negative for chills and fever  Eyes: Negative for discharge  Respiratory: Negative for shortness of breath  Cardiovascular: Negative for chest pain  Gastrointestinal: Negative for constipation and diarrhea  Genitourinary: Negative for difficulty urinating  Musculoskeletal: Negative for joint swelling  Skin: Positive for rash  Neurological: Negative for headaches  Hematological: Negative for adenopathy  Psychiatric/Behavioral: The patient is not nervous/anxious  Objective:  BP (!) 182/88 (BP Location: Left arm, Patient Position: Sitting, Cuff Size: Large)   Pulse 69   Temp (!) 96 4 °F (35 8 °C) (Temporal)   Ht 5' 1" (1 549 m)   Wt 96 6 kg (213 lb)   SpO2 97%   BMI 40 25 kg/m²      Physical Exam  Vitals and nursing note reviewed  Constitutional:       General: She is not in acute distress  Appearance: She is well-developed  She is not diaphoretic  HENT:      Head: Normocephalic and atraumatic  Right Ear: External ear normal       Left Ear: External ear normal    Eyes:      General: Lids are normal          Right eye: No discharge  Left eye: No discharge  Conjunctiva/sclera: Conjunctivae normal    Cardiovascular:      Rate and Rhythm: Normal rate and regular rhythm  Heart sounds: No murmur heard    Pulmonary:      Effort: Pulmonary effort is normal  No respiratory distress  Breath sounds: Normal breath sounds  No wheezing  Musculoskeletal:         General: No deformity  Cervical back: Neck supple  Skin:     General: Skin is warm and dry  Findings: Rash present  Rash is macular and papular  Comments: Rash diffusely spread across abdomen, chest, arms, small amount on back, legs  No hives  No open areas  No swelling  No open sores/drainage  Neurological:      Mental Status: She is alert and oriented to person, place, and time  Psychiatric:         Speech: Speech normal          Behavior: Behavior normal          Thought Content: Thought content normal          Judgment: Judgment normal              Depression Screening and Follow-up Plan: Patient was screened for depression during today's encounter  They screened negative with a PHQ-2 score of 0          Current Outpatient Medications:   •  azithromycin (ZITHROMAX) 250 mg tablet, Take 2 tablets today then 1 tablet daily x 4 days, Disp: 6 tablet, Rfl: 0  •  Cholecalciferol 50 MCG (2000 UT) CAPS, Take 1 tablet by mouth daily, Disp: , Rfl:   •  CINNAMON PO, Take by mouth, Disp: , Rfl:   •  Coenzyme Q10 (Co Q 10) 10 MG CAPS, Take 1 capsule by mouth daily, Disp: , Rfl:   •  levOCARNitine (L-Carnitine) 250 MG TABS, Take by mouth, Disp: , Rfl:   •  MULTIPLE VITAMINS PO, Take by mouth daily, Disp: , Rfl:   •  Multiple Vitamins-Minerals (Hair Skin & Nails Advanced) TABS, Take 1 tablet by mouth daily, Disp: , Rfl:   •  Turmeric, Curcuma Longa, (Curcumin) POWD, Use, Disp: , Rfl:   •  VITAMIN E PO, Take by mouth, Disp: , Rfl:   Allergies   Allergen Reactions   • Fish-Derived Products - Food Allergy Swelling   • Other Itching   • Penicillins Hives   • Clindamycin Rash   • Latex Itching

## 2022-10-17 NOTE — PATIENT INSTRUCTIONS
Complete labs  These are fasting  Continue to use topical agents for rash at this time  Wait until rash subsided then can start new antibiotic  Start azithromycin, this is the antibiotic, This is 2 pills on day one and then 1 pill for the following 4 days  Follow up with Dr Yue Jeff for routine visit next month  Please call the office if you are experiencing any worsening of symptoms or no symptom improvement

## 2022-10-31 LAB — HBA1C MFR BLD HPLC: 6.3 %

## 2022-11-16 ENCOUNTER — RA CDI HCC (OUTPATIENT)
Dept: OTHER | Facility: HOSPITAL | Age: 72
End: 2022-11-16

## 2022-11-25 ENCOUNTER — OFFICE VISIT (OUTPATIENT)
Dept: FAMILY MEDICINE CLINIC | Facility: CLINIC | Age: 72
End: 2022-11-25

## 2022-11-25 VITALS
TEMPERATURE: 97.3 F | WEIGHT: 211.6 LBS | BODY MASS INDEX: 39.95 KG/M2 | SYSTOLIC BLOOD PRESSURE: 158 MMHG | HEIGHT: 61 IN | HEART RATE: 86 BPM | DIASTOLIC BLOOD PRESSURE: 78 MMHG | OXYGEN SATURATION: 97 % | RESPIRATION RATE: 16 BRPM

## 2022-11-25 DIAGNOSIS — R03.0 ELEVATED BLOOD PRESSURE READING WITHOUT DIAGNOSIS OF HYPERTENSION: ICD-10-CM

## 2022-11-25 DIAGNOSIS — K04.7 DENTAL ABSCESS: ICD-10-CM

## 2022-11-25 DIAGNOSIS — Z00.00 MEDICARE ANNUAL WELLNESS VISIT, SUBSEQUENT: Primary | ICD-10-CM

## 2022-11-25 DIAGNOSIS — R73.03 PRE-DIABETES: ICD-10-CM

## 2022-11-25 RX ORDER — DOXYCYCLINE HYCLATE 100 MG/1
100 CAPSULE ORAL EVERY 12 HOURS SCHEDULED
Qty: 20 CAPSULE | Refills: 0 | Status: SHIPPED | OUTPATIENT
Start: 2022-11-25 | End: 2022-12-05

## 2022-11-25 NOTE — PROGRESS NOTES
Assessment and Plan:   Adamaris Ying was seen today for new patient visit and medicare wellness visit  Diagnoses and all orders for this visit:    Medicare annual wellness visit, subsequent    Pre-diabetes    Elevated blood pressure reading without diagnosis of hypertension    Dental abscess  -     doxycycline hyclate (VIBRAMYCIN) 100 mg capsule; Take 1 capsule (100 mg total) by mouth every 12 (twelve) hours for 10 days      Problem List Items Addressed This Visit        Other    Pre-diabetes   Other Visit Diagnoses     Medicare annual wellness visit, subsequent    -  Primary    Elevated blood pressure reading without diagnosis of hypertension        Dental abscess        Relevant Medications    doxycycline hyclate (VIBRAMYCIN) 100 mg capsule          Depression Screening and Follow-up Plan: Patient was screened for depression during today's encounter  They screened negative with a PHQ-2 score of 0  Preventive health issues were discussed with patient, and age appropriate screening tests were ordered as noted in patient's After Visit Summary  Personalized health advice and appropriate referrals for health education or preventive services given if needed, as noted in patient's After Visit Summary  History of Present Illness:     Patient presents for a Medicare Wellness Visit    This is a 69-year-old female who is reestablishing with me  Last seen in 2017  She was seen several months ago by her practitioner Flora Connelly, lab work was ordered and also discuss today  patient is status post gastric bypass surgery 2009  Patient's blood pressure has been running higher at dental office  She does have a ambulatory cuff and is getting readings are not as high  Today numbers are borderline  Patient feels it is primarily from her dental issue infection affect antibody  We discussed at length watching her salt, watching feeding    She will need close follow-up here 3 guards through blood pressure numbers and the prediabetes  The patient is asked to make an attempt to improve diet and exercise patterns to aid in medical management of this problem  Chief Complaint   Patient presents with   • New Patient Visit     New patient /  Old patient , hasn't seen a new doctor since last visit with Dr Marisela Weldon  2017 Pt is not covid vaccinated    • Medicare Wellness Visit     Pt states she went to the dentist and her blood pressure was high  Discuss prediabetes Pt states has a dental infection, pt had RSV 2 weeks ago pt declines flu shot today  Pt had blood work done      Patient Care Team:  Jacqualin Paget, DO as PCP - General (Family Medicine)     Review of Systems:   BYPASS 2010 RNY  Review of Systems   HENT: Positive for dental problem (dental pulled teeth, still infection, feels she still needs to be on antibiotic )  Last comparative hemoglobin A1c was 6 1% 2017  Total cholesterol in 2017 equal on 89, triglycerides 114, HDL 65,  so current numbers are all worse    Patient wait time in 2017 was 186 lb  (now 211#)   Problem List:     Patient Active Problem List   Diagnosis   • Fecal occult blood test positive   • Nail hypertrophy   • Oral lesion   • Pre-diabetes      Past Medical and Surgical History:     Past Medical History:   Diagnosis Date   • Bleeding gums    • Dental abscess    • Dental caries    • Diabetes mellitus ([address] )    • Periodontal disease      History reviewed  No pertinent surgical history     Family History:     Family History   Problem Relation Age of Onset   • Diabetes Mother    • Heart disease Mother    • Heart disease Father    • Hepatitis Brother    • Kidney disease Daughter    • Breast cancer additional onset Maternal Grandmother       Social History:     Social History     Socioeconomic History   • Marital status:      Spouse name: None   • Number of children: None   • Years of education: None   • Highest education level: None   Occupational History   • None   Tobacco Use   • Smoking status: Never   • Smokeless tobacco: Never   Vaping Use   • Vaping Use: Never used   Substance and Sexual Activity   • Alcohol use: Not Currently   • Drug use: Never   • Sexual activity: Not Currently   Other Topics Concern   • None   Social History Narrative   • None     Social Determinants of Health     Financial Resource Strain: Not on file   Food Insecurity: Not on file   Transportation Needs: Not on file   Physical Activity: Not on file   Stress: Not on file   Social Connections: Not on file   Intimate Partner Violence: Not on file   Housing Stability: Not on file      Medications and Allergies:     Current Outpatient Medications   Medication Sig Dispense Refill   • Cholecalciferol 50 MCG (2000 UT) CAPS Take 1 tablet by mouth daily     • CINNAMON PO Take by mouth     • Coenzyme Q10 (Co Q 10) 10 MG CAPS Take 1 capsule by mouth daily     • doxycycline hyclate (VIBRAMYCIN) 100 mg capsule Take 1 capsule (100 mg total) by mouth every 12 (twelve) hours for 10 days 20 capsule 0   • levOCARNitine (L-Carnitine) 250 MG TABS Take by mouth     • MULTIPLE VITAMINS PO Take by mouth daily     • Multiple Vitamins-Minerals (Hair Skin & Nails Advanced) TABS Take 1 tablet by mouth daily     • Turmeric, Curcuma Longa, (Curcumin) POWD Use     • VITAMIN E PO Take by mouth       No current facility-administered medications for this visit  Allergies   Allergen Reactions   • Fish-Derived Products - Food Allergy Swelling   • Other Itching   • Penicillins Hives   • Clindamycin Rash   • Latex Itching      Immunizations: There is no immunization history on file for this patient  Health Maintenance:         Topic Date Due   • Colorectal Cancer Screening  12/28/2018   • Breast Cancer Screening: Mammogram  11/25/2023 (Originally 10/9/1990)   • Hepatitis C Screening  12/25/2024 (Originally 1950)     There are no preventive care reminders to display for this patient     Medicare Screening Tests and Risk Assessments:     Srinath Christie is here for her Subsequent Wellness visit  Health Risk Assessment:   Patient rates overall health as very good  Patient feels that their physical health rating is slightly worse  Patient is satisfied with their life  Eyesight was rated as same  Hearing was rated as same  Patient feels that their emotional and mental health rating is same  Patients states they are never, rarely angry  Patient states they are sometimes unusually tired/fatigued  Pain experienced in the last 7 days has been some  Patient's pain rating has been 9/10  Patient states that she has experienced no weight loss or gain in last 6 months  Depression Screening:   PHQ-2 Score: 0      Fall Risk Screening: In the past year, patient has experienced: no history of falling in past year      Urinary Incontinence Screening:   Patient has leaked urine accidently in the last six months  Home Safety:  Patient does not have trouble with stairs inside or outside of their home  Patient has working smoke alarms and has working carbon monoxide detector  Home safety hazards include: none  Nutrition:   Current diet is Regular  Medications:   Patient is currently taking over-the-counter supplements  OTC medications include: see medication list  Patient is able to manage medications  Activities of Daily Living (ADLs)/Instrumental Activities of Daily Living (IADLs):   Walk and transfer into and out of bed and chair?: Yes  Dress and groom yourself?: Yes    Bathe or shower yourself?: Yes    Feed yourself?  Yes  Do your laundry/housekeeping?: Yes  Manage your money, pay your bills and track your expenses?: Yes  Make your own meals?: Yes    Do your own shopping?: Yes    Previous Hospitalizations:   Any hospitalizations or ED visits within the last 12 months?: No      Advance Care Planning:   Living will: Yes    Advanced directive: Yes    End of Life Decisions reviewed with patient: Yes    Provider agrees with end of life decisions: Yes      Cognitive Screening:   Provider or family/friend/caregiver concerned regarding cognition?: No    PREVENTIVE SCREENINGS      Cardiovascular Screening:    General: Screening Current      Diabetes Screening:     General: Screening Current      Colorectal Cancer Screening:     General: Patient Declines      Breast Cancer Screening:     General: Risks and Benefits Discussed and Patient Declines      Cervical Cancer Screening:    General: Screening Not Indicated      Osteoporosis Screening:    General: Risks and Benefits Discussed and Patient Declines      Abdominal Aortic Aneurysm (AAA) Screening:        General: Screening Not Indicated      Lung Cancer Screening:     General: Screening Not Indicated      Hepatitis C Screening:    General: Screening Current    Screening, Brief Intervention, and Referral to Treatment (SBIRT)    Screening  Typical number of drinks in a day: 0  Typical number of drinks in a week: 1  Interpretation: Low risk drinking behavior  Single Item Drug Screening:  How often have you used an illegal drug (including marijuana) or a prescription medication for non-medical reasons in the past year? never    Single Item Drug Screen Score: 0  Interpretation: Negative screen for possible drug use disorder    No results found  Physical Exam:     /78   Pulse 86   Temp (!) 97 3 °F (36 3 °C) (Temporal)   Resp 16   Ht 5' 1" (1 549 m)   Wt 96 kg (211 lb 9 6 oz)   SpO2 97%   BMI 39 98 kg/m²     Physical Exam  Vitals and nursing note reviewed  Constitutional:       General: She is not in acute distress  Appearance: Normal appearance  She is well-developed  Comments: 41-year-old female who appears younger than her stated age with elevated BMI of 39%   HENT:      Head: Normocephalic and atraumatic        Right Ear: Tympanic membrane normal       Left Ear: Tympanic membrane normal       Nose: Nose normal       Mouth/Throat:      Mouth: Mucous membranes are moist    Eyes:      Conjunctiva/sclera: Conjunctivae normal    Neck:      Vascular: No carotid bruit  Cardiovascular:      Rate and Rhythm: Normal rate and regular rhythm  Heart sounds: No murmur heard  Pulmonary:      Effort: Pulmonary effort is normal  No respiratory distress  Breath sounds: Normal breath sounds  Abdominal:      Palpations: Abdomen is soft  Tenderness: There is no abdominal tenderness  Musculoskeletal:         General: No swelling  Skin:     General: Skin is warm and dry  Capillary Refill: Capillary refill takes less than 2 seconds  Neurological:      Mental Status: She is alert and oriented to person, place, and time  Psychiatric:         Mood and Affect: Mood normal  Anxious: Slight  Behavior: Behavior normal          Thought Content:  Thought content normal          Judgment: Judgment normal       Comments: Expansive          Pearl Kramer DO

## 2022-11-25 NOTE — PATIENT INSTRUCTIONS
CALL before end of antibiotic to decide on "daily" dose  thereafter  Medicare Preventive Visit Patient Instructions  Thank you for completing your Welcome to Medicare Visit or Medicare Annual Wellness Visit today  Your next wellness visit will be due in one year (11/26/2023)  The screening/preventive services that you may require over the next 5-10 years are detailed below  Some tests may not apply to you based off risk factors and/or age  Screening tests ordered at today's visit but not completed yet may show as past due  Also, please note that scanned in results may not display below  Preventive Screenings:  Service Recommendations Previous Testing/Comments   Colorectal Cancer Screening  * Colonoscopy    * Fecal Occult Blood Test (FOBT)/Fecal Immunochemical Test (FIT)  * Fecal DNA/Cologuard Test  * Flexible Sigmoidoscopy Age: 39-70 years old   Colonoscopy: every 10 years (may be performed more frequently if at higher risk)  OR  FOBT/FIT: every 1 year  OR  Cologuard: every 3 years  OR  Sigmoidoscopy: every 5 years  Screening may be recommended earlier than age 39 if at higher risk for colorectal cancer  Also, an individualized decision between you and your healthcare provider will decide whether screening between the ages of 74-80 would be appropriate  Colonoscopy: Not on file  FOBT/FIT: Not on file  Cologuard: Not on file  Sigmoidoscopy: Not on file          Breast Cancer Screening Age: 36 years old  Frequency: every 1-2 years  Not required if history of left and right mastectomy Mammogram: Not on file        Cervical Cancer Screening Between the ages of 21-29, pap smear recommended once every 3 years  Between the ages of 33-67, can perform pap smear with HPV co-testing every 5 years     Recommendations may differ for women with a history of total hysterectomy, cervical cancer, or abnormal pap smears in past  Pap Smear: 12/11/2017        Hepatitis C Screening Once for adults born between 1945 and 1965  More frequently in patients at high risk for Hepatitis C Hep C Antibody: Not on file        Diabetes Screening 1-2 times per year if you're at risk for diabetes or have pre-diabetes Fasting glucose: No results in last 5 years (No results in last 5 years)  A1C: 6 3 (10/31/2022)      Cholesterol Screening Once every 5 years if you don't have a lipid disorder  May order more often based on risk factors  Lipid panel: Not on file          Other Preventive Screenings Covered by Medicare:  Abdominal Aortic Aneurysm (AAA) Screening: covered once if your at risk  You're considered to be at risk if you have a family history of AAA  Lung Cancer Screening: covers low dose CT scan once per year if you meet all of the following conditions: (1) Age 50-69; (2) No signs or symptoms of lung cancer; (3) Current smoker or have quit smoking within the last 15 years; (4) You have a tobacco smoking history of at least 20 pack years (packs per day multiplied by number of years you smoked); (5) You get a written order from a healthcare provider  Glaucoma Screening: covered annually if you're considered high risk: (1) You have diabetes OR (2) Family history of glaucoma OR (3)  aged 48 and older OR (3)  American aged 72 and older  Osteoporosis Screening: covered every 2 years if you meet one of the following conditions: (1) You're estrogen deficient and at risk for osteoporosis based off medical history and other findings; (2) Have a vertebral abnormality; (3) On glucocorticoid therapy for more than 3 months; (4) Have primary hyperparathyroidism; (5) On osteoporosis medications and need to assess response to drug therapy  Last bone density test (DXA Scan): Not on file  HIV Screening: covered annually if you're between the age of 12-76  Also covered annually if you are younger than 13 and older than 72 with risk factors for HIV infection   For pregnant patients, it is covered up to 3 times per pregnancy  Immunizations:  Immunization Recommendations   Influenza Vaccine Annual influenza vaccination during flu season is recommended for all persons aged >= 6 months who do not have contraindications   Pneumococcal Vaccine   * Pneumococcal conjugate vaccine = PCV13 (Prevnar 13), PCV15 (Vaxneuvance), PCV20 (Prevnar 20)  * Pneumococcal polysaccharide vaccine = PPSV23 (Pneumovax) Adults 25-60 years old: 1-3 doses may be recommended based on certain risk factors  Adults 72 years old: 1-2 doses may be recommended based off what pneumonia vaccine you previously received   Hepatitis B Vaccine 3 dose series if at intermediate or high risk (ex: diabetes, end stage renal disease, liver disease)   Tetanus (Td) Vaccine - COST NOT COVERED BY MEDICARE PART B Following completion of primary series, a booster dose should be given every 10 years to maintain immunity against tetanus  Td may also be given as tetanus wound prophylaxis  Tdap Vaccine - COST NOT COVERED BY MEDICARE PART B Recommended at least once for all adults  For pregnant patients, recommended with each pregnancy  Shingles Vaccine (Shingrix) - COST NOT COVERED BY MEDICARE PART B  2 shot series recommended in those aged 48 and above     Health Maintenance Due:      Topic Date Due    Hepatitis C Screening  Never done    Breast Cancer Screening: Mammogram  Never done    Colorectal Cancer Screening  12/28/2018     Immunizations Due:      Topic Date Due    Hepatitis B Vaccine (1 of 3 - 3-dose series) Never done    COVID-19 Vaccine (1) Never done    Pneumococcal Vaccine: 65+ Years (1 - PCV) Never done    Influenza Vaccine (1) Never done     Advance Directives   What are advance directives? Advance directives are legal documents that state your wishes and plans for medical care  These plans are made ahead of time in case you lose your ability to make decisions for yourself   Advance directives can apply to any medical decision, such as the treatments you want, and if you want to donate organs  What are the types of advance directives? There are many types of advance directives, and each state has rules about how to use them  You may choose a combination of any of the following:  Living will: This is a written record of the treatment you want  You can also choose which treatments you do not want, which to limit, and which to stop at a certain time  This includes surgery, medicine, IV fluid, and tube feedings  [facility] power of  for healthcare [facility]): This is a written record that states who you want to make healthcare choices for you when you are unable to make them for yourself  This person, called a proxy, is usually a family member or a friend  You may choose more than 1 proxy  Do not resuscitate (DNR) order:  A DNR order is used in case your heart stops beating or you stop breathing  It is a request not to have certain forms of treatment, such as CPR  A DNR order may be included in other types of advance directives  Medical directive: This covers the care that you want if you are in a coma, near death, or unable to make decisions for yourself  You can list the treatments you want for each condition  Treatment may include pain medicine, surgery, blood transfusions, dialysis, IV or tube feedings, and a ventilator (breathing machine)  Values history: This document has questions about your views, beliefs, and how you feel and think about life  This information can help others choose the care that you would choose  Why are advance directives important? An advance directive helps you control your care  Although spoken wishes may be used, it is better to have your wishes written down  Spoken wishes can be misunderstood, or not followed  Treatments may be given even if you do not want them  An advance directive may make it easier for your family to make difficult choices about your care     Weight Management   Why it is important to manage your weight: Being overweight increases your risk of health conditions such as heart disease, high blood pressure, type 2 diabetes, and certain types of cancer  It can also increase your risk for osteoarthritis, sleep apnea, and other respiratory problems  Aim for a slow, steady weight loss  Even a small amount of weight loss can lower your risk of health problems  How to lose weight safely:  A safe and healthy way to lose weight is to eat fewer calories and get regular exercise  You can lose up about 1 pound a week by decreasing the number of calories you eat by 500 calories each day  Healthy meal plan for weight management:  A healthy meal plan includes a variety of foods, contains fewer calories, and helps you stay healthy  A healthy meal plan includes the following:  Eat whole-grain foods more often  A healthy meal plan should contain fiber  Fiber is the part of grains, fruits, and vegetables that is not broken down by your body  Whole-grain foods are healthy and provide extra fiber in your diet  Some examples of whole-grain foods are whole-wheat breads and pastas, oatmeal, brown rice, and bulgur  Eat a variety of vegetables every day  Include dark, leafy greens such as spinach, kale, ti greens, and mustard greens  Eat yellow and orange vegetables such as carrots, sweet potatoes, and winter squash  Eat a variety of fruits every day  Choose fresh or canned fruit (canned in its own juice or light syrup) instead of juice  Fruit juice has very little or no fiber  Eat low-fat dairy foods  Drink fat-free (skim) milk or 1% milk  Eat fat-free yogurt and low-fat cottage cheese  Try low-fat cheeses such as mozzarella and other reduced-fat cheeses  Choose meat and other protein foods that are low in fat  Choose beans or other legumes such as split peas or lentils  Choose fish, skinless poultry (chicken or turkey), or lean cuts of red meat (beef or pork)  Before you cook meat or poultry, cut off any visible fat     Use less fat and oil  Try baking foods instead of frying them  Add less fat, such as margarine, sour cream, regular salad dressing and mayonnaise to foods  Eat fewer high-fat foods  Some examples of high-fat foods include french fries, doughnuts, ice cream, and cakes  Eat fewer sweets  Limit foods and drinks that are high in sugar  This includes candy, cookies, regular soda, and sweetened drinks  Exercise:  Exercise at least 30 minutes per day on most days of the week  Some examples of exercise include walking, biking, dancing, and swimming  You can also fit in more physical activity by taking the stairs instead of the elevator or parking farther away from stores  Ask your healthcare provider about the best exercise plan for you  © Copyright [employer] 2018 Information is for End User's use only and may not be sold, redistributed or otherwise used for commercial purposes   All illustrations and images included in CareNotes® are the copyrighted property of A D A M , Inc  or [address] [place] [address]

## 2022-12-29 ENCOUNTER — OFFICE VISIT (OUTPATIENT)
Dept: DENTISTRY | Facility: CLINIC | Age: 72
End: 2022-12-29

## 2022-12-29 VITALS — HEART RATE: 71 BPM | DIASTOLIC BLOOD PRESSURE: 90 MMHG | SYSTOLIC BLOOD PRESSURE: 165 MMHG | TEMPERATURE: 98.7 F

## 2022-12-29 DIAGNOSIS — Z01.20 ENCOUNTER FOR DENTAL EXAMINATION: Primary | ICD-10-CM

## 2022-12-29 NOTE — PROGRESS NOTES
Dental procedures in this visit   •  - PERIODONTAL MAINTENANCE (Completed)     Service provider: Brigitte Lerma 195     Billing provider: Marco Jang DMD     Subjective   Patient ID: Traci Williamson is a 67 y o  female  Chief Complaint   Patient presents with   • Periodontal Maintenance     PERIODONTAL MAINTENANCE     Reviewed medical history  Pt states she at her PCP and that she does not need BP meds at this time  ASA -  II  Pain - 0    Periodontal therapy includes removal of bacterial plaque and calculus from supragingival and subgingival regions  Site specific scaling and root planning where indicated, and polishing the teeth    ---Lt calc and plaque  Used the cavitron and hand scaled  ---Stressed longer brush at gumline and the use of a proxybrush interproximally  ---Pt not interested in getting tooth #19 extracted at this time  Periodontal therapy recommendation to continue 3 months intervals       Referral:  None  NV1:  Ex, Perio main - 60 min - 3 months after 12/ 29 /22

## 2023-02-07 ENCOUNTER — TELEPHONE (OUTPATIENT)
Dept: FAMILY MEDICINE CLINIC | Facility: CLINIC | Age: 73
End: 2023-02-07

## 2023-02-07 DIAGNOSIS — K04.7 DENTAL INFECTION: Primary | ICD-10-CM

## 2023-02-07 RX ORDER — DOXYCYCLINE HYCLATE 100 MG/1
100 CAPSULE ORAL EVERY 12 HOURS SCHEDULED
Qty: 20 CAPSULE | Refills: 0 | Status: SHIPPED | OUTPATIENT
Start: 2023-02-07 | End: 2023-02-17 | Stop reason: SDUPTHER

## 2023-02-07 NOTE — TELEPHONE ENCOUNTER
Pt called requesting a refill on Doxycycline she states she took all of them but now feels she needs to take it again  Please advise  Thank you  The pharmacy she uses is [facility] in [place]

## 2023-02-17 ENCOUNTER — TELEPHONE (OUTPATIENT)
Dept: FAMILY MEDICINE CLINIC | Facility: CLINIC | Age: 73
End: 2023-02-17

## 2023-02-17 DIAGNOSIS — K04.7 DENTAL INFECTION: ICD-10-CM

## 2023-02-17 RX ORDER — DOXYCYCLINE HYCLATE 100 MG/1
100 CAPSULE ORAL EVERY 12 HOURS SCHEDULED
Qty: 28 CAPSULE | Refills: 0 | Status: SHIPPED | OUTPATIENT
Start: 2023-02-17 | End: 2023-03-03

## 2023-02-17 NOTE — TELEPHONE ENCOUNTER
Patient is on the last of the doxycycline and she was wondering if she should still cont  With another script  She stated that that she is not feeling any better

## 2023-02-17 NOTE — TELEPHONE ENCOUNTER
Spoke w/ pt and she states she goes to a dental clinic and could be 3 months until she gets in  Pt states she was supposed to call back for additional scripts  Pt states she has been on abx twice for 10 days and feels 10 days is not long enough  Please advise

## 2023-02-19 ENCOUNTER — RA CDI HCC (OUTPATIENT)
Dept: OTHER | Facility: HOSPITAL | Age: 73
End: 2023-02-19

## 2023-02-27 ENCOUNTER — OFFICE VISIT (OUTPATIENT)
Dept: FAMILY MEDICINE CLINIC | Facility: CLINIC | Age: 73
End: 2023-02-27

## 2023-02-27 VITALS
DIASTOLIC BLOOD PRESSURE: 80 MMHG | WEIGHT: 212 LBS | HEART RATE: 64 BPM | SYSTOLIC BLOOD PRESSURE: 134 MMHG | TEMPERATURE: 97 F | BODY MASS INDEX: 40.02 KG/M2 | HEIGHT: 61 IN

## 2023-02-27 DIAGNOSIS — Z12.11 SCREEN FOR COLON CANCER: ICD-10-CM

## 2023-02-27 DIAGNOSIS — R73.03 PRE-DIABETES: Primary | ICD-10-CM

## 2023-02-27 DIAGNOSIS — E66.01 MORBID OBESITY (HCC): ICD-10-CM

## 2023-02-27 DIAGNOSIS — R73.9 HYPERGLYCEMIA: ICD-10-CM

## 2023-02-27 LAB — SL AMB POCT HEMOGLOBIN AIC: 6.2 (ref ?–6.5)

## 2023-02-27 NOTE — PROGRESS NOTES
Name: Donte Barone      : 1950      MRN: [id number]  Encounter Provider: Miky Jasmine DO  Encounter Date: 2023   Encounter department: [address]     1  Pre-diabetes    2  Hyperglycemia  -     POCT hemoglobin A1c    3  Screen for colon cancer  -     Cologuard    4  Morbid obesity ([facility] [address] )  Comments:  Remote bariatric surgery      BMI Counseling: Body mass index is 40 06 kg/m²  The BMI is above normal  Nutrition recommendations include decreasing portion sizes, encouraging healthy choices of fruits and vegetables, decreasing fast food intake, consuming healthier snacks, limiting drinks that contain sugar, moderation in carbohydrate intake, increasing intake of lean protein, reducing intake of saturated and trans fat and reducing intake of cholesterol  Exercise recommendations include exercising 3-5 times per week  Patient referred to PCP  Rationale for BMI follow-up plan is due to patient being overweight or obese  Specially focus on prediabetes with regards to minimum carb portion control  The patient is asked to make an attempt to improve diet and exercise patterns to aid in medical management of this problem  Subjective     40-year-old female here for follow-up on prediabetes  Due for recheck hemoglobin A1c in office  She continues to have issues with dental   She is willing to try off of antibiotic  I had left a message with [facility] dental clinic with regards to moving forward with more definitive treatment rather than leaving the "infection "in  Chief Complaint   Patient presents with   • prediabetes     Hga1c       Review of Systems   HENT: Dental problem: continued dental/gingival care  All other systems reviewed and are negative    has three extraction on LEFT side  Has partial  Broke cap on Left  "Infected" Right tooth  Working one day a week--- [employer]  Burburine/chromium/muitle supplements-for sugar    Component Latest Ref Rng & Units 2/27/2023   Hemoglobin A1C      6 5 6 2     Lab work done in November with hemoglobin A1c of 6 3% so there is some marginal improvement  Patient had previous FIT test positive (2017) and did not follow-up with colonoscopy  Recommend rechecking Cologuard but ultimately should have colonoscopy    Past Medical History:   Diagnosis Date   • Bleeding gums    • Dental abscess    • Dental caries    • Diabetes mellitus ([facility] [address] )    • Periodontal disease      Past Surgical History:   Procedure Laterality Date   • CHEY-EN-Y PROCEDURE     • CHEY-EN-Y PROCEDURE  07/27/2006     Family History   Problem Relation Age of Onset   • Diabetes Mother    • Heart disease Mother    • Heart disease Father    • Heart disease Sister    • Hepatitis Brother    • Liver disease Brother    • Kidney disease Daughter    • Breast cancer additional onset Maternal Grandmother      Social History     Socioeconomic History   • Marital status:      Spouse name: None   • Number of children: None   • Years of education: None   • Highest education level: None   Occupational History   • None   Tobacco Use   • Smoking status: Never   • Smokeless tobacco: Never   Vaping Use   • Vaping Use: Never used   Substance and Sexual Activity   • Alcohol use: Not Currently   • Drug use: Never   • Sexual activity: Not Currently   Other Topics Concern   • None   Social History Narrative   • None     Social Determinants of Health     Financial Resource Strain: Not on file   Food Insecurity: Not on file   Transportation Needs: Not on file   Physical Activity: Not on file   Stress: Not on file   Social Connections: Not on file   Intimate Partner Violence: Not on file   Housing Stability: Not on file     Current Outpatient Medications on File Prior to Visit   Medication Sig   • Cholecalciferol 50 MCG (2000 UT) CAPS Take 1 tablet by mouth daily   • CINNAMON PO Take by mouth   • Coenzyme Q10 (Co Q 10) 10 MG CAPS Take 1 capsule by mouth daily   • doxycycline hyclate (VIBRAMYCIN) 100 mg capsule Take 1 capsule (100 mg total) by mouth every 12 (twelve) hours for 14 days   • levOCARNitine (L-Carnitine) 250 MG TABS Take by mouth   • MULTIPLE VITAMINS PO Take by mouth daily   • Multiple Vitamins-Minerals (Hair Skin & Nails Advanced) TABS Take 1 tablet by mouth daily   • Turmeric, Curcuma Longa, (Curcumin) POWD Use   • VITAMIN E PO Take by mouth     Allergies   Allergen Reactions   • Fish-Derived Products - Food Allergy Swelling   • Other Itching   • Penicillins Hives   • Clindamycin Rash   • Latex Itching       There is no immunization history on file for this patient  Objective     /80 (BP Location: Right arm)   Pulse 64   Temp (!) 97 °F (36 1 °C)   Ht 5' 1" (1 549 m)   Wt 96 2 kg (212 lb)   BMI 40 06 kg/m²   144/78 on left arm  Physical Exam  Vitals reviewed  HENT:      Right Ear: Tympanic membrane normal       Left Ear: Tympanic membrane normal       Mouth/Throat:      Mouth: Mucous membranes are moist    Eyes:      Pupils: Pupils are equal, round, and reactive to light  Cardiovascular:      Rate and Rhythm: Normal rate  Pulmonary:      Effort: Pulmonary effort is normal       Breath sounds: Normal breath sounds  Abdominal:      General: Bowel sounds are normal       Palpations: Abdomen is soft  Neurological:      Mental Status: She is alert and oriented to person, place, and time  Psychiatric:         Mood and Affect: Mood is anxious  Behavior: Behavior is cooperative         Bartholome Castleman, DO

## 2023-03-01 PROBLEM — E66.01 MORBID OBESITY (HCC): Status: ACTIVE | Noted: 2023-03-01

## 2023-03-22 LAB — COLOGUARD RESULT REPORTABLE: NEGATIVE

## 2023-03-31 ENCOUNTER — OFFICE VISIT (OUTPATIENT)
Dept: DENTISTRY | Facility: CLINIC | Age: 73
End: 2023-03-31

## 2023-03-31 VITALS — DIASTOLIC BLOOD PRESSURE: 88 MMHG | HEART RATE: 94 BPM | SYSTOLIC BLOOD PRESSURE: 149 MMHG | TEMPERATURE: 98.2 F

## 2023-03-31 DIAGNOSIS — Z01.20 ENCOUNTER FOR DENTAL EXAMINATION: Primary | ICD-10-CM

## 2023-03-31 NOTE — PROGRESS NOTES
PERIODONTAL MAINTENANCE     Reviewed medical history   ASA II  Pain - 0  Reviewed M/    Periodontal therapy includes removal of bacterial plaque and calculus from supragingival and subgingival regions  ---Lt to mod calc and plaque  ---Cavitron, handscaled, polish, floss  --- Site specific scaling and root planning where indicated, and polishing the teeth    ---Periodontal therapy recommendation to continue 3 months intervals       Exam:  None  Referral:  None    NV1:  Exam, Bws - 45 min  as soon as possible - was due 4/7/23  NV2:  Perio maintenance - 60 min - 3 months from 4/1/23

## 2023-06-23 ENCOUNTER — OFFICE VISIT (OUTPATIENT)
Dept: DENTISTRY | Facility: CLINIC | Age: 73
End: 2023-06-23

## 2023-06-23 VITALS — HEART RATE: 70 BPM | DIASTOLIC BLOOD PRESSURE: 73 MMHG | SYSTOLIC BLOOD PRESSURE: 186 MMHG | TEMPERATURE: 97.7 F

## 2023-06-23 DIAGNOSIS — Z01.20 ENCOUNTER FOR DENTAL EXAMINATION: Primary | ICD-10-CM

## 2023-06-23 PROCEDURE — D0120 PERIODIC ORAL EVALUATION - ESTABLISHED PATIENT: HCPCS | Performed by: DENTIST

## 2023-06-23 PROCEDURE — D0274 BITEWINGS - 4 RADIOGRAPHIC IMAGES: HCPCS | Performed by: DENTAL HYGIENIST

## 2023-06-23 NOTE — DENTAL PROCEDURE DETAILS
Ana Laura Coombs presents for a Periodic exam  Verbal consent for treatment given in addition to the forms  Reviewed health history - Patient is ASA II  Consents signed: Yes     Perio: Generalized, Slight bleeding, and Recession  Pain Scale: 0  Caries Assessment: Medium  Radiographs: Bitewings x4  EO/IO/OCS:  WNL     Oral Hygiene instruction reviewed and given  OHI:  Fair  ---lt to mod plaque and calc  ---Online RX for Prevident 5000 to pt's pharmacy     Treatment Plan:  1  Periodontal therapy: 3 month perio maintenance  3  Caries control:   ---Decay under crowns 3, 4, 5, 19  Discussed the need for new crowns  ---19 also has furcation involvement  Pt does not want to have 19 extracted at this time - no pain in tooth  ---need perio consult - will evaluate at next perio main  W/ FMP  We informed the patient that our Periodontist is no longer at this site and she would need to travel to [facility] for any additional perio tx  Pt stated she does not want to travel to [facility] - only here  4   Occlusal evaluation:   many missing teeth  5  Case Difficulty Type 2    Prognosis is Good    Referrals needed: No  Exam:  Dr Ruiz Andrew:  Perio main - 60 min - scheduled 7/31/23

## 2023-06-23 NOTE — PROGRESS NOTES
NOTE: This patient was informed she should have a periodontal evaluation with the specialist-we would refer to [place]  She stated she does not want to travel to [place];             - She was informed of interproximal caries under existing crowns but she is not interested in replacing them at this time  - She is aware that she needs extr #19             - She does not want to use Fluoride tooth paste thought suggested to give her Prevident to use  Then she stated that she may consider     Pt to make appt for treatment plan after her perio maintenance cleaning;  REC- perio evaluation with specialist;  Rx- Prevident 5000 if pt decides to try the TP

## 2023-07-31 ENCOUNTER — OFFICE VISIT (OUTPATIENT)
Dept: DENTISTRY | Facility: CLINIC | Age: 73
End: 2023-07-31

## 2023-07-31 VITALS — HEART RATE: 84 BPM | DIASTOLIC BLOOD PRESSURE: 86 MMHG | SYSTOLIC BLOOD PRESSURE: 165 MMHG | TEMPERATURE: 98.9 F

## 2023-07-31 DIAGNOSIS — Z01.20 ENCOUNTER FOR DENTAL EXAMINATION: Primary | ICD-10-CM

## 2023-07-31 PROCEDURE — D4910 PERIODONTAL MAINTENANCE: HCPCS | Performed by: DENTAL HYGIENIST

## 2023-07-31 NOTE — DENTAL PROCEDURE DETAILS
PERIODONTAL MAINTENANCE     Reviewed medical history   ASA II  Pain - 0    Periodontal therapy includes removal of bacterial plaque and calculus from supragingival and subgingival regions. ---lt calc and plaque  ---Cavitron, handscaled, polish, floss  ---Site specific scaling and root planning where indicated. Periodontal therapy recommendation to continue 3 months intervals.      Exam:  none  Referral:  none    NV1:  Perio main - 3 months from 7/31/23 - 60 min

## 2023-08-18 ENCOUNTER — RA CDI HCC (OUTPATIENT)
Dept: OTHER | Facility: HOSPITAL | Age: 73
End: 2023-08-18

## 2023-08-18 NOTE — PROGRESS NOTES
[facility] coding opportunities       Chart reviewed, no opportunity found: CHART REVIEWED, NO OPPORTUNITY FOUND        Patients Insurance     Medicare Insurance: Medicare

## 2023-08-28 ENCOUNTER — OFFICE VISIT (OUTPATIENT)
Dept: FAMILY MEDICINE CLINIC | Facility: CLINIC | Age: 73
End: 2023-08-28
Payer: MEDICARE

## 2023-08-28 DIAGNOSIS — G57.11 MERALGIA PARESTHETICA OF RIGHT SIDE: ICD-10-CM

## 2023-08-28 DIAGNOSIS — M20.41 HAMMERTOE OF SECOND TOE OF RIGHT FOOT: ICD-10-CM

## 2023-08-28 DIAGNOSIS — L60.8 INCURVATED NAIL: ICD-10-CM

## 2023-08-28 DIAGNOSIS — E66.9 OBESITY (BMI 35.0-39.9 WITHOUT COMORBIDITY): ICD-10-CM

## 2023-08-28 DIAGNOSIS — E78.2 MIXED HYPERLIPIDEMIA: ICD-10-CM

## 2023-08-28 DIAGNOSIS — R73.03 PRE-DIABETES: Primary | ICD-10-CM

## 2023-08-28 PROCEDURE — 99214 OFFICE O/P EST MOD 30 MIN: CPT | Performed by: FAMILY MEDICINE

## 2023-08-28 NOTE — PROGRESS NOTES
Name: Beata Flores      : 1950      MRN: [id number]  Encounter Provider: Hermelinda Kussmaul, DO  Encounter Date: 2023   Encounter department: [address] [place] [address]     1. Pre-diabetes  -     HEMOGLOBIN A1C W/ EAG ESTIMATION; Future  -     Lipid Panel with Direct LDL reflex; Future    2. Mixed hyperlipidemia  -     HEMOGLOBIN A1C W/ EAG ESTIMATION; Future  -     Comprehensive metabolic panel; Future  -     Lipid Panel with Direct LDL reflex; Future    3. Incurvated nail-toe nail right second    4. Hammertoe of second toe of right foot    5. Obesity (BMI 35.0-39.9 without comorbidity)    6. Meralgia paresthetica of right side         Continue efforts at dietary and fitness to achieve improved BMI. Follow-up after lab work in October with regards to prediabetes. Subjective     58-year-old female is here for follow-up. Complaints as listed. History of prediabetes. Will be due for follow-up full lab work in October. She declines having repeat hemoglobin A1c today. She admits that she can do much better with dietary. Chief Complaint   Patient presents with   • Follow-up     Pt is here for follow up office visit    Cologuard was unremarkable in March  When patient was seen in February hemoglobin A1c was performed and was in prediabetic range at 6.2% although this was better than previous October 6.3% at another lab. Patient is not on any prescription medications does take supplements  Review of Systems   Constitutional: Negative for unexpected weight change. HENT:        Itchy ear canals   Eyes:        Wears contact/glasses. colloidal silver    Respiratory: Negative for shortness of breath. Cardiovascular: Negative for chest pain. Gastrointestinal: Positive for constipation (all my life-benefiber ,magnesium). Skin: Positive for rash (itchy scalp on right side forehead and scalp).         Right toe fungus   Neurological:        Meralgia parestheticus Concerned about parasites- anal itchy  ? Fluid  Build up-- uses  K+ supplement (there is no sign of any edema peripherally or centrally)  I discussed with patient I generally would recommend avoiding the colloid silver and the eyes. Should update ophthalmological visit.   I did give referral to local optometrist.  Past Medical History:   Diagnosis Date   • Bleeding gums    • Dental abscess    • Dental caries    • Diabetes mellitus ([address])    • Periodontal disease      Past Surgical History:   Procedure Laterality Date   • CHEY-EN-Y PROCEDURE     • CHEY-EN-Y PROCEDURE  07/27/2006     Family History   Problem Relation Age of Onset   • Diabetes Mother    • Heart disease Mother    • Heart disease Father    • Heart disease Sister    • Hepatitis Brother    • Liver disease Brother    • Kidney disease Daughter    • Breast cancer additional onset Maternal Grandmother      Social History     Socioeconomic History   • Marital status:      Spouse name: None   • Number of children: None   • Years of education: None   • Highest education level: None   Occupational History   • None   Tobacco Use   • Smoking status: Never   • Smokeless tobacco: Never   Vaping Use   • Vaping Use: Never used   Substance and Sexual Activity   • Alcohol use: Not Currently   • Drug use: Never   • Sexual activity: Not Currently   Other Topics Concern   • None   Social History Narrative   • None     Social Determinants of Health     Financial Resource Strain: Not on file   Food Insecurity: Not on file   Transportation Needs: Not on file   Physical Activity: Not on file   Stress: Not on file   Social Connections: Not on file   Intimate Partner Violence: Not on file   Housing Stability: Not on file     Current Outpatient Medications on File Prior to Visit   Medication Sig   • Cholecalciferol 50 MCG (2000 UT) CAPS Take 1 tablet by mouth daily   • CINNAMON PO Take by mouth   • Coenzyme Q10 (Co Q 10) 10 MG CAPS Take 1 capsule by mouth daily   • levOCARNitine (L-Carnitine) 250 MG TABS Take by mouth   • Turmeric, Curcuma Longa, (Curcumin) POWD Use     Allergies   Allergen Reactions   • Fish-Derived Products - Food Allergy Swelling   • Other Itching   • Penicillins Hives   • Clindamycin Rash   • Latex Itching       There is no immunization history on file for this patient. Objective     /84   Pulse 81   Temp (!) 97.2 °F (36.2 °C) (Temporal)   Resp 16   Ht 5' 1.81" (1.57 m)   Wt 98.2 kg (216 lb 9.6 oz)   SpO2 96%   BMI 39.86 kg/m²     Physical Exam  Vitals reviewed. Constitutional:       General: She is not in acute distress. Appearance: Normal appearance. She is well-developed. She is obese. Comments: 79-year-old female who appears slightly younger than her stated age no acute distress   HENT:      Head: Normocephalic. Right Ear: Ear canal normal.      Left Ear: Ear canal normal.      Ears:      Comments: Initially some cerumen noted irrigated without difficulty and no eczema noted in canals. Nose: No congestion or rhinorrhea. Eyes:      General: No scleral icterus. Conjunctiva/sclera: Conjunctivae normal.      Pupils: Pupils are equal, round, and reactive to light. Neck:      Vascular: No carotid bruit. Cardiovascular:      Rate and Rhythm: Normal rate and regular rhythm. Heart sounds: No murmur heard. Pulmonary:      Effort: Pulmonary effort is normal.      Breath sounds: Normal breath sounds. Abdominal:      General: Bowel sounds are normal.      Palpations: Abdomen is soft. There is no mass. Tenderness: There is no abdominal tenderness. Musculoskeletal:         General: Normal range of motion. Right lower leg: No edema. Left lower leg: No edema. Comments: Ambulatory without difficulty neuro intact strength normal sensation normal   Skin:     Findings: No rash. Comments: No significant areas of plaque or eczema noted in scalp or neck.   Incurvated second toenail right foot (slight hammertoe noted) no secondary signs of foot fungus or nail fungus. Neurological:      Mental Status: She is alert and oriented to person, place, and time. Deep Tendon Reflexes: Reflexes are normal and symmetric. Comments: There is slight decrease sensory along the lateral femoral cutaneous distribution right thigh consistent with meralgia paresthetica's   Psychiatric:         Mood and Affect: Mood is anxious (Slightly). Behavior: Behavior normal.         Thought Content:  Thought content normal.      Comments: Slightly expansive       Olga Lidia Rouse,

## 2023-09-01 VITALS
OXYGEN SATURATION: 96 % | HEIGHT: 62 IN | RESPIRATION RATE: 16 BRPM | TEMPERATURE: 97.2 F | SYSTOLIC BLOOD PRESSURE: 142 MMHG | BODY MASS INDEX: 39.86 KG/M2 | WEIGHT: 216.6 LBS | HEART RATE: 81 BPM | DIASTOLIC BLOOD PRESSURE: 84 MMHG

## 2023-12-12 ENCOUNTER — OFFICE VISIT (OUTPATIENT)
Dept: DENTISTRY | Facility: CLINIC | Age: 73
End: 2023-12-12

## 2023-12-12 VITALS — HEART RATE: 103 BPM | DIASTOLIC BLOOD PRESSURE: 83 MMHG | TEMPERATURE: 97.9 F | SYSTOLIC BLOOD PRESSURE: 157 MMHG

## 2023-12-12 DIAGNOSIS — Z01.20 ENCOUNTER FOR DENTAL EXAMINATION: Primary | ICD-10-CM

## 2023-12-12 PROCEDURE — D4910 PERIODONTAL MAINTENANCE: HCPCS | Performed by: DENTAL HYGIENIST

## 2023-12-12 NOTE — DENTAL PROCEDURE DETAILS
PERIODONTAL MAINTENANCE     Reviewed medical history   ASA  II  Pain - 0    Periodontal therapy includes removal of bacterial plaque and calculus from supragingival and subgingival regions. Site specific scaling and root planning where indicated, and polishing the teeth.   ---Lt calc and plaque  ---Cavitron, handscaled, polish, floss  Periodontal therapy recommendation to continue 3 months intervals.      Exam:  none  Referral:  none    NV1:  Ex, [facility] - 3 months from 12/12/23 - 60 min

## 2023-12-19 LAB — HBA1C MFR BLD HPLC: 6.5 %

## 2024-01-05 ENCOUNTER — RA CDI HCC (OUTPATIENT)
Dept: OTHER | Facility: HOSPITAL | Age: 74
End: 2024-01-05

## 2024-01-12 ENCOUNTER — OFFICE VISIT (OUTPATIENT)
Dept: FAMILY MEDICINE CLINIC | Facility: CLINIC | Age: 74
End: 2024-01-12
Payer: MEDICARE

## 2024-01-12 VITALS
HEART RATE: 77 BPM | SYSTOLIC BLOOD PRESSURE: 128 MMHG | WEIGHT: 223 LBS | TEMPERATURE: 97.3 F | DIASTOLIC BLOOD PRESSURE: 74 MMHG | OXYGEN SATURATION: 97 % | HEIGHT: 61 IN | BODY MASS INDEX: 42.1 KG/M2

## 2024-01-12 DIAGNOSIS — Z13.820 ENCOUNTER FOR OSTEOPOROSIS SCREENING IN ASYMPTOMATIC POSTMENOPAUSAL PATIENT: ICD-10-CM

## 2024-01-12 DIAGNOSIS — Z12.31 ENCOUNTER FOR SCREENING MAMMOGRAM FOR BREAST CANCER: Primary | ICD-10-CM

## 2024-01-12 DIAGNOSIS — R73.03 PRE-DIABETES: ICD-10-CM

## 2024-01-12 DIAGNOSIS — E66.01 MORBID OBESITY (HCC): ICD-10-CM

## 2024-01-12 DIAGNOSIS — E78.2 MIXED HYPERLIPIDEMIA: ICD-10-CM

## 2024-01-12 DIAGNOSIS — Z78.0 ENCOUNTER FOR OSTEOPOROSIS SCREENING IN ASYMPTOMATIC POSTMENOPAUSAL PATIENT: ICD-10-CM

## 2024-01-12 PROCEDURE — G0439 PPPS, SUBSEQ VISIT: HCPCS | Performed by: FAMILY MEDICINE

## 2024-01-12 NOTE — PROGRESS NOTES
Assessment and Plan:   Shey was seen today for medicare wellness visit.    Diagnoses and all orders for this visit:    Encounter for screening mammogram for breast cancer    Encounter for osteoporosis screening in asymptomatic postmenopausal patient  -     DXA bone density spine hip and pelvis; Future    Morbid obesity (HCC)    Pre-diabetes  -     Lipid Panel with Direct LDL reflex; Future  -     Comprehensive metabolic panel; Future  -     Hemoglobin A1C; Future    Mixed hyperlipidemia  -     Lipid Panel with Direct LDL reflex; Future       Problem List Items Addressed This Visit          Other    Pre-diabetes    Relevant Orders    Lipid Panel with Direct LDL reflex    Comprehensive metabolic panel    Hemoglobin A1C    Morbid obesity (HCC)     Other Visit Diagnoses       Encounter for screening mammogram for breast cancer    -  Primary    Encounter for osteoporosis screening in asymptomatic postmenopausal patient        Relevant Orders    DXA bone density spine hip and pelvis    Mixed hyperlipidemia        Relevant Orders    Lipid Panel with Direct LDL reflex             Preventive health issues were discussed with patient, and age appropriate screening tests were ordered as noted in patient's After Visit Summary.  Personalized health advice and appropriate referrals for health education or preventive services given if needed, as noted in patient's After Visit Summary.     History of Present Illness:     Patient presents for a Medicare Wellness Visit    Patient is a 73-year-old female here for Medicare wellness.  She declines flu shot or any vaccination updates.  She declines mammogram, will consider DEXA..  Lab work reviewed from December...  She prefers homeopathic/natural treatment approaches       Patient Care Team:  Freya Xavier,  as PCP - General (Family Medicine)     Review of Systems:     Review of Systems   Musculoskeletal:  Negative for back pain.        Eye twitching, magnesium help    Psychiatric/Behavioral:  Positive for sleep disturbance (better with L-theanine).    All other systems reviewed and are negative.    Component 12/19/23 12:00 AM   Hemoglobin A1C 6.5      Problem List:     Patient Active Problem List   Diagnosis    Fecal occult blood test positive    Nail hypertrophy    Oral lesion    Pre-diabetes    Morbid obesity (HCC)      Past Medical and Surgical History:     Past Medical History:   Diagnosis Date    Bleeding gums     Dental abscess     Dental caries     Diabetes mellitus (HCC)     Periodontal disease      Past Surgical History:   Procedure Laterality Date    CHEY-EN-Y PROCEDURE      CHEY-EN-Y PROCEDURE  07/27/2006      Family History:     Family History   Problem Relation Age of Onset    Diabetes Mother     Heart disease Mother     Heart disease Father     Heart disease Sister     Hepatitis Brother     Liver disease Brother     Kidney disease Daughter     Breast cancer additional onset Maternal Grandmother       Social History:     Social History     Socioeconomic History    Marital status:      Spouse name: None    Number of children: None    Years of education: None    Highest education level: None   Occupational History    None   Tobacco Use    Smoking status: Former     Types: Cigarettes    Smokeless tobacco: Never   Vaping Use    Vaping status: Never Used   Substance and Sexual Activity    Alcohol use: Not Currently    Drug use: Never    Sexual activity: Not Currently     Partners: Male   Other Topics Concern    None   Social History Narrative    None     Social Determinants of Health     Financial Resource Strain: Low Risk  (1/12/2024)    Overall Financial Resource Strain (CARDIA)     Difficulty of Paying Living Expenses: Not hard at all   Food Insecurity: Not on file   Transportation Needs: No Transportation Needs (1/12/2024)    PRAPARE - Transportation     Lack of Transportation (Medical): No     Lack of Transportation (Non-Medical): No   Physical Activity:  Not on file   Stress: Not on file   Social Connections: Not on file   Intimate Partner Violence: Not on file   Housing Stability: Not on file      Medications and Allergies:     Current Outpatient Medications   Medication Sig Dispense Refill    Cholecalciferol 50 MCG (2000 UT) CAPS Take 1 tablet by mouth daily      CINNAMON PO Take by mouth      Coenzyme Q10 (Co Q 10) 10 MG CAPS Take 1 capsule by mouth daily      levOCARNitine (L-Carnitine) 250 MG TABS Take by mouth      Menaquinone-7 (K2 PO) Take by mouth      Turmeric, Curcuma Longa, (Curcumin) POWD Use       No current facility-administered medications for this visit.     Allergies   Allergen Reactions    Fish-Derived Products - Food Allergy Swelling    Other Itching    Penicillins Hives    Clindamycin Rash    Latex Itching      Immunizations:       There is no immunization history on file for this patient.   Health Maintenance:         Topic Date Due    Hepatitis C Screening  12/25/2024 (Originally 1950)    Breast Cancer Screening: Mammogram  01/20/2025 (Originally 10/9/1990)    Colorectal Cancer Screening  03/14/2026     There are no preventive care reminders to display for this patient.     Medicare Screening Tests and Risk Assessments:     Shey is here for her Subsequent Wellness visit.     Health Risk Assessment:   Patient rates overall health as very good. Patient feels that their physical health rating is same. Patient is satisfied with their life. Eyesight was rated as same. Hearing was rated as same. Patient feels that their emotional and mental health rating is same. Patients states they are never, rarely angry. Patient states they are sometimes unusually tired/fatigued. Pain experienced in the last 7 days has been none. Patient states that she has experienced no weight loss or gain in last 6 months.     Fall Risk Screening:   In the past year, patient has experienced: no history of falling in past year      Urinary Incontinence Screening:    Patient has not leaked urine accidently in the last six months.     Home Safety:  Patient does not have trouble with stairs inside or outside of their home. Patient has working smoke alarms and has working carbon monoxide detector. Home safety hazards include: none.     Nutrition:   Current diet is Regular.     Medications:   Patient is currently taking over-the-counter supplements. OTC medications include: see medication list. Patient is able to manage medications.     Activities of Daily Living (ADLs)/Instrumental Activities of Daily Living (IADLs):   Walk and transfer into and out of bed and chair?: Yes  Dress and groom yourself?: Yes    Bathe or shower yourself?: Yes    Feed yourself? Yes  Do your laundry/housekeeping?: Yes  Manage your money, pay your bills and track your expenses?: Yes  Make your own meals?: Yes    Do your own shopping?: Yes    Previous Hospitalizations:   Any hospitalizations or ED visits within the last 12 months?: No      Advance Care Planning:   Living will: Yes    Durable POA for healthcare: Yes    Advanced directive: Yes    End of Life Decisions reviewed with patient: Yes    Provider agrees with end of life decisions: Yes      Cognitive Screening:   Provider or family/friend/caregiver concerned regarding cognition?: No    PREVENTIVE SCREENINGS      Cardiovascular Screening:    General: History Lipid Disorder and Screening Current      Diabetes Screening:     General: Screening Current      Colorectal Cancer Screening:     General: Screening Current      Breast Cancer Screening:     General: Patient Declines      Cervical Cancer Screening:    General: Screening Not Indicated      Osteoporosis Screening:    General: Patient Declines    Due for: DXA Axial and DXA Appendicular      Abdominal Aortic Aneurysm (AAA) Screening:        General: Patient Declines and Screening Not Indicated      Lung Cancer Screening:     General: Screening Not Indicated      Hepatitis C Screening:    General:  "Screening Not Indicated    Hep C Screening Accepted: No     Screening, Brief Intervention, and Referral to Treatment (SBIRT)    Screening  Typical number of drinks in a day: 0  Typical number of drinks in a week: 0  Interpretation: Low risk drinking behavior.    Single Item Drug Screening:  How often have you used an illegal drug (including marijuana) or a prescription medication for non-medical reasons in the past year? never    Single Item Drug Screen Score: 0  Interpretation: Negative screen for possible drug use disorder    No results found.     Physical Exam:     /74   Pulse 77   Temp (!) 97.3 °F (36.3 °C) (Temporal)   Ht 5' 1\" (1.549 m)   Wt 101 kg (223 lb)   SpO2 97%   BMI 42.14 kg/m²     Physical Exam  Vitals and nursing note reviewed.   Constitutional:       General: She is not in acute distress.     Appearance: Normal appearance. She is well-developed. She is obese.   HENT:      Head: Normocephalic and atraumatic.      Right Ear: Tympanic membrane normal.      Left Ear: Tympanic membrane normal.      Nose: Nose normal.      Mouth/Throat:      Mouth: Mucous membranes are moist.   Eyes:      Conjunctiva/sclera: Conjunctivae normal.   Neck:      Vascular: No carotid bruit.   Cardiovascular:      Rate and Rhythm: Normal rate and regular rhythm.      Heart sounds: No murmur heard.  Pulmonary:      Effort: Pulmonary effort is normal. No respiratory distress.      Breath sounds: Normal breath sounds.   Abdominal:      General: Bowel sounds are normal.      Palpations: Abdomen is soft. There is no mass.      Tenderness: There is no abdominal tenderness.   Musculoskeletal:         General: Normal range of motion.      Right lower leg: No edema.      Left lower leg: No edema.   Skin:     Findings: No rash.   Neurological:      Mental Status: She is alert and oriented to person, place, and time.   Psychiatric:         Mood and Affect: Mood normal.         Behavior: Behavior normal.         Thought " Content: Thought content normal.         Judgment: Judgment normal.          Freya Xavier, DO

## 2024-01-12 NOTE — PATIENT INSTRUCTIONS
Medicare Preventive Visit Patient Instructions  Thank you for completing your Welcome to Medicare Visit or Medicare Annual Wellness Visit today. Your next wellness visit will be due in one year (1/12/2025).  The screening/preventive services that you may require over the next 5-10 years are detailed below. Some tests may not apply to you based off risk factors and/or age. Screening tests ordered at today's visit but not completed yet may show as past due. Also, please note that scanned in results may not display below.  Preventive Screenings:  Service Recommendations Previous Testing/Comments   Colorectal Cancer Screening  * Colonoscopy    * Fecal Occult Blood Test (FOBT)/Fecal Immunochemical Test (FIT)  * Fecal DNA/Cologuard Test  * Flexible Sigmoidoscopy Age: 45-75 years old   Colonoscopy: every 10 years (may be performed more frequently if at higher risk)  OR  FOBT/FIT: every 1 year  OR  Cologuard: every 3 years  OR  Sigmoidoscopy: every 5 years  Screening may be recommended earlier than age 45 if at higher risk for colorectal cancer. Also, an individualized decision between you and your healthcare provider will decide whether screening between the ages of 76-85 would be appropriate. Colonoscopy: 03/14/2023  FOBT/FIT: 03/14/2023  Cologuard: 03/14/2023  Sigmoidoscopy: 03/14/2023    Screening Current     Breast Cancer Screening Age: 40+ years old  Frequency: every 1-2 years  Not required if history of left and right mastectomy Mammogram: Not on file        Cervical Cancer Screening Between the ages of 21-29, pap smear recommended once every 3 years.   Between the ages of 30-65, can perform pap smear with HPV co-testing every 5 years.   Recommendations may differ for women with a history of total hysterectomy, cervical cancer, or abnormal pap smears in past. Pap Smear: 12/11/2017    Screening Not Indicated   Hepatitis C Screening Once for adults born between 1945 and 1965  More frequently in patients at high risk  for Hepatitis C Hep C Antibody: Not on file    Screening Current   Diabetes Screening 1-2 times per year if you're at risk for diabetes or have pre-diabetes Fasting glucose: No results in last 5 years (No results in last 5 years)  A1C: 6.5 (12/19/2023)  Screening Current   Cholesterol Screening Once every 5 years if you don't have a lipid disorder. May order more often based on risk factors. Lipid panel: Not on file    Screening Not Indicated  History Lipid Disorder     Other Preventive Screenings Covered by Medicare:  Abdominal Aortic Aneurysm (AAA) Screening: covered once if your at risk. You're considered to be at risk if you have a family history of AAA.  Lung Cancer Screening: covers low dose CT scan once per year if you meet all of the following conditions: (1) Age 55-77; (2) No signs or symptoms of lung cancer; (3) Current smoker or have quit smoking within the last 15 years; (4) You have a tobacco smoking history of at least 20 pack years (packs per day multiplied by number of years you smoked); (5) You get a written order from a healthcare provider.  Glaucoma Screening: covered annually if you're considered high risk: (1) You have diabetes OR (2) Family history of glaucoma OR (3)  aged 50 and older OR (4)  American aged 65 and older  Osteoporosis Screening: covered every 2 years if you meet one of the following conditions: (1) You're estrogen deficient and at risk for osteoporosis based off medical history and other findings; (2) Have a vertebral abnormality; (3) On glucocorticoid therapy for more than 3 months; (4) Have primary hyperparathyroidism; (5) On osteoporosis medications and need to assess response to drug therapy.   Last bone density test (DXA Scan): Not on file.  HIV Screening: covered annually if you're between the age of 15-65. Also covered annually if you are younger than 15 and older than 65 with risk factors for HIV infection. For pregnant patients, it is covered up  to 3 times per pregnancy.    Immunizations:  Immunization Recommendations   Influenza Vaccine Annual influenza vaccination during flu season is recommended for all persons aged >= 6 months who do not have contraindications   Pneumococcal Vaccine   * Pneumococcal conjugate vaccine = PCV13 (Prevnar 13), PCV15 (Vaxneuvance), PCV20 (Prevnar 20)  * Pneumococcal polysaccharide vaccine = PPSV23 (Pneumovax) Adults 19-63 yo with certain risk factors or if 65+ yo  If never received any pneumonia vaccine: recommend Prevnar 20 (PCV20)  Give PCV20 if previously received 1 dose of PCV13 or PPSV23   Hepatitis B Vaccine 3 dose series if at intermediate or high risk (ex: diabetes, end stage renal disease, liver disease)   Respiratory syncytial virus (RSV) Vaccine - COVERED BY MEDICARE PART D  * RSVPreF3 (Arexvy) CDC recommends that adults 60 years of age and older may receive a single dose of RSV vaccine using shared clinical decision-making (SCDM)   Tetanus (Td) Vaccine - COST NOT COVERED BY MEDICARE PART B Following completion of primary series, a booster dose should be given every 10 years to maintain immunity against tetanus. Td may also be given as tetanus wound prophylaxis.   Tdap Vaccine - COST NOT COVERED BY MEDICARE PART B Recommended at least once for all adults. For pregnant patients, recommended with each pregnancy.   Shingles Vaccine (Shingrix) - COST NOT COVERED BY MEDICARE PART B  2 shot series recommended in those 19 years and older who have or will have weakened immune systems or those 50 years and older     Health Maintenance Due:      Topic Date Due   • Breast Cancer Screening: Mammogram  Never done   • Hepatitis C Screening  12/25/2024 (Originally 1950)   • Colorectal Cancer Screening  03/14/2026     Immunizations Due:      Topic Date Due   • COVID-19 Vaccine (1) Never done   • Pneumococcal Vaccine: 65+ Years (1 - PCV) Never done   • Influenza Vaccine (1) Never done     Advance Directives   What are advance  directives?  Advance directives are legal documents that state your wishes and plans for medical care. These plans are made ahead of time in case you lose your ability to make decisions for yourself. Advance directives can apply to any medical decision, such as the treatments you want, and if you want to donate organs.   What are the types of advance directives?  There are many types of advance directives, and each state has rules about how to use them. You may choose a combination of any of the following:  Living will:  This is a written record of the treatment you want. You can also choose which treatments you do not want, which to limit, and which to stop at a certain time. This includes surgery, medicine, IV fluid, and tube feedings.   Durable power of  for healthcare (DPAHC):  This is a written record that states who you want to make healthcare choices for you when you are unable to make them for yourself. This person, called a proxy, is usually a family member or a friend. You may choose more than 1 proxy.  Do not resuscitate (DNR) order:  A DNR order is used in case your heart stops beating or you stop breathing. It is a request not to have certain forms of treatment, such as CPR. A DNR order may be included in other types of advance directives.  Medical directive:  This covers the care that you want if you are in a coma, near death, or unable to make decisions for yourself. You can list the treatments you want for each condition. Treatment may include pain medicine, surgery, blood transfusions, dialysis, IV or tube feedings, and a ventilator (breathing machine).  Values history:  This document has questions about your views, beliefs, and how you feel and think about life. This information can help others choose the care that you would choose.  Why are advance directives important?  An advance directive helps you control your care. Although spoken wishes may be used, it is better to have your wishes  written down. Spoken wishes can be misunderstood, or not followed. Treatments may be given even if you do not want them. An advance directive may make it easier for your family to make difficult choices about your care.   Weight Management   Why it is important to manage your weight:  Being overweight increases your risk of health conditions such as heart disease, high blood pressure, type 2 diabetes, and certain types of cancer. It can also increase your risk for osteoarthritis, sleep apnea, and other respiratory problems. Aim for a slow, steady weight loss. Even a small amount of weight loss can lower your risk of health problems.  How to lose weight safely:  A safe and healthy way to lose weight is to eat fewer calories and get regular exercise. You can lose up about 1 pound a week by decreasing the number of calories you eat by 500 calories each day.   Healthy meal plan for weight management:  A healthy meal plan includes a variety of foods, contains fewer calories, and helps you stay healthy. A healthy meal plan includes the following:  Eat whole-grain foods more often.  A healthy meal plan should contain fiber. Fiber is the part of grains, fruits, and vegetables that is not broken down by your body. Whole-grain foods are healthy and provide extra fiber in your diet. Some examples of whole-grain foods are whole-wheat breads and pastas, oatmeal, brown rice, and bulgur.  Eat a variety of vegetables every day.  Include dark, leafy greens such as spinach, kale, ti greens, and mustard greens. Eat yellow and orange vegetables such as carrots, sweet potatoes, and winter squash.   Eat a variety of fruits every day.  Choose fresh or canned fruit (canned in its own juice or light syrup) instead of juice. Fruit juice has very little or no fiber.  Eat low-fat dairy foods.  Drink fat-free (skim) milk or 1% milk. Eat fat-free yogurt and low-fat cottage cheese. Try low-fat cheeses such as mozzarella and other reduced-fat  cheeses.  Choose meat and other protein foods that are low in fat.  Choose beans or other legumes such as split peas or lentils. Choose fish, skinless poultry (chicken or turkey), or lean cuts of red meat (beef or pork). Before you cook meat or poultry, cut off any visible fat.   Use less fat and oil.  Try baking foods instead of frying them. Add less fat, such as margarine, sour cream, regular salad dressing and mayonnaise to foods. Eat fewer high-fat foods. Some examples of high-fat foods include french fries, doughnuts, ice cream, and cakes.  Eat fewer sweets.  Limit foods and drinks that are high in sugar. This includes candy, cookies, regular soda, and sweetened drinks.  Exercise:  Exercise at least 30 minutes per day on most days of the week. Some examples of exercise include walking, biking, dancing, and swimming. You can also fit in more physical activity by taking the stairs instead of the elevator or parking farther away from stores. Ask your healthcare provider about the best exercise plan for you.      © Copyright [employer] 2018 Information is for End User's use only and may not be sold, redistributed or otherwise used for commercial purposes. All illustrations and images included in CareNotes® are the copyrighted property of A.D.A.M., Inc. or [employer]

## 2024-03-21 ENCOUNTER — OFFICE VISIT (OUTPATIENT)
Dept: DENTISTRY | Facility: CLINIC | Age: 74
End: 2024-03-21

## 2024-03-21 VITALS — TEMPERATURE: 98.6 F | DIASTOLIC BLOOD PRESSURE: 71 MMHG | HEART RATE: 83 BPM | SYSTOLIC BLOOD PRESSURE: 152 MMHG

## 2024-03-21 DIAGNOSIS — Z01.20 ENCOUNTER FOR DENTAL EXAMINATION: Primary | ICD-10-CM

## 2024-03-21 PROCEDURE — D4910 PERIODONTAL MAINTENANCE: HCPCS | Performed by: DENTAL HYGIENIST

## 2024-03-21 PROCEDURE — D0120 PERIODIC ORAL EVALUATION - ESTABLISHED PATIENT: HCPCS | Performed by: DENTIST

## 2024-03-21 NOTE — DENTAL PROCEDURE DETAILS
Shey Ortiz presents for a Periodic exam. Verbal consent for treatment given in addition to the forms.     Reviewed health history - Patient is ASA II  Consents signed: Yes     Perio: Generalized  Pain Scale: 0  Caries Assessment: Medium  Radiographs: None  EO/IO/OCS:  WNL     Oral Hygiene instruction reviewed and given.  ---Stressed brush 2 X/day and floss 1 X/day  ---Recommended the use of a proxybrush and a waterpik daily  OHI:  Fair  ---Lt calc and plaque  ---Cavitron, handscaled, polish, floss  Recommended Hygiene recall visits with Shey.     Treatment Plan:  1.  Periodontal therapy: 3 month perio maintenance  2.  Caries control: Watch areas as charted  ---#19 recommended previously for ext but pt does not want it taken out unless necessary  ---Also stated that her PD's do not fit right and does not wear them.  Does not want them redone at this time.  3.  Occlusal evaluation: Missing teeth    Prognosis is Good.  Referrals needed: No  Exam:  Dr. Lees    NV1:  Perio main - 50 min - 3 months from 3/ 22/ 24

## 2024-06-28 ENCOUNTER — OFFICE VISIT (OUTPATIENT)
Dept: DENTISTRY | Facility: CLINIC | Age: 74
End: 2024-06-28

## 2024-06-28 VITALS — HEART RATE: 105 BPM | DIASTOLIC BLOOD PRESSURE: 91 MMHG | SYSTOLIC BLOOD PRESSURE: 172 MMHG | TEMPERATURE: 99.5 F

## 2024-06-28 DIAGNOSIS — K05.6 PERIODONTAL DISEASE: Primary | ICD-10-CM

## 2024-06-28 PROCEDURE — D4910 PERIODONTAL MAINTENANCE: HCPCS | Performed by: DENTAL HYGIENIST

## 2024-06-28 NOTE — DENTAL PROCEDURE DETAILS
3 Month Periodontal Maintenance      REVIEWED MED HX: meds, allergies, health changes reviewed in EPIC  CHIEF CONCERN:  none   PAIN SCALE: 0  ASA CLASS:  ASA 2 - Patient with mild systemic disease with no functional limitations  PLAQUE:  mild  CALCULUS:  Light  BLEEDING:  none  STAIN: None  PERIO:   Lt to mod bone loss    Hygiene Procedures: Scaled, Polished, Flossed and Used Cavitron    Oral Hygiene Instruction: Brushing minimum 2x daily for 2 minutes, daily flossing, Listerine, and Recommended soft toothbrush only    Visual and Tactile Intraoral/ Extraoral evaluation: Oral and Oropharyngeal cancer evaluation. No findings     REFERRALS: None    EXAM: Dr. Lees    CARIES FINDINGS:  No decay    NV1:  Ex, BW4, Perio main - 50 min - 3 - 4 months after 9/22/24    Last BWX:  6/23/23  Last FMX :  1/4/22

## 2024-09-26 ENCOUNTER — OFFICE VISIT (OUTPATIENT)
Dept: DENTISTRY | Facility: CLINIC | Age: 74
End: 2024-09-26

## 2024-09-26 VITALS — SYSTOLIC BLOOD PRESSURE: 151 MMHG | DIASTOLIC BLOOD PRESSURE: 79 MMHG | TEMPERATURE: 98.4 F

## 2024-09-26 DIAGNOSIS — Z01.20 ENCOUNTER FOR DENTAL EXAMINATION: Primary | ICD-10-CM

## 2024-09-26 PROCEDURE — D0120 PERIODIC ORAL EVALUATION - ESTABLISHED PATIENT: HCPCS | Performed by: DENTIST

## 2024-09-26 PROCEDURE — D1330 ORAL HYGIENE INSTRUCTIONS: HCPCS | Performed by: DENTAL HYGIENIST

## 2024-09-26 PROCEDURE — D4910 PERIODONTAL MAINTENANCE: HCPCS | Performed by: DENTAL HYGIENIST

## 2024-09-26 PROCEDURE — D0274 BITEWINGS - 4 RADIOGRAPHIC IMAGES: HCPCS | Performed by: DENTAL HYGIENIST

## 2024-09-26 NOTE — DENTAL PROCEDURE DETAILS
PERIODIC EXAM, ADULT PROPHY , 4 BWX   REVIEWED MED HX: meds, allergies, health changes reviewed in [facility]. All consents signed.  CHIEF CONCERN: my  maxillary denture is loose  ---denture was not made here and pt is missing many teeth on the UL - denture will not have the stability she desires.  PAIN SCALE:  0  ASA CLASS:  ASA 2 - Patient with mild systemic disease with no functional limitations  PLAQUE:  moderate  CALCULUS: Light  BLEEDING:  moderate  STAIN : None     PERIO:   Mod to severe bone loss and recession  ---Need to do a FMP at the next recall    Hygiene Procedures:  Scaled, Polished, Flossed and Used Cavitron    Oral Hygiene Instruction: Brushing minimum 2x daily for 2 minutes, daily flossing, Listerine, and Recommended soft toothbrush only    Dispensed: Toothbrush, Toothpaste, Floss, and Proxabrush    Visual and Tactile Intraoral/ Extraoral evaluation: Oral and Oropharyngeal cancer evaluation. No findings     Dr. Clark   Reviewed with patient clinical and radiographic findings and patient verbalized understanding. All questions and concerns addressed.     REFERRALS: None    CARIES FINDINGS:  Many interproximal areas of decay.  Will monitor for now.  Pt states she is not in pain and does not want to do anything right now.  ---TX options discussed -   Extract remaining maxillary teeth and make a CUD  2.   Extract #19       TREATMENT  PLAN :   NV1:  Perio main - 50 min - 3 months form 9/27/24    Last BWX: 9/26/24  Last Panorex/ FMX :

## 2024-12-30 ENCOUNTER — OFFICE VISIT (OUTPATIENT)
Dept: DENTISTRY | Facility: CLINIC | Age: 74
End: 2024-12-30

## 2024-12-30 VITALS — SYSTOLIC BLOOD PRESSURE: 170 MMHG | DIASTOLIC BLOOD PRESSURE: 87 MMHG | HEART RATE: 101 BPM | TEMPERATURE: 98 F

## 2024-12-30 DIAGNOSIS — K05.6 PERIODONTAL DISEASE: Primary | ICD-10-CM

## 2024-12-30 PROCEDURE — D4910 PERIODONTAL MAINTENANCE: HCPCS

## 2025-01-03 LAB
ALBUMIN SERPL-MCNC: 4.4 G/DL (ref 3.5–5.7)
ALP SERPL-CCNC: 79 U/L (ref 35–120)
ALT SERPL-CCNC: 18 U/L
ANION GAP SERPL CALCULATED.3IONS-SCNC: 7 MMOL/L (ref 3–11)
AST SERPL-CCNC: 22 U/L
BILIRUB SERPL-MCNC: 0.6 MG/DL (ref 0.2–1)
BUN SERPL-MCNC: 12 MG/DL (ref 7–25)
CALCIUM SERPL-MCNC: 9.5 MG/DL (ref 8.5–10.5)
CHLORIDE SERPL-SCNC: 106 MMOL/L (ref 100–109)
CHOLEST SERPL-MCNC: 251 MG/DL
CHOLEST/HDLC SERPL: 4.7 {RATIO}
CO2 SERPL-SCNC: 29 MMOL/L (ref 21–31)
CREAT SERPL-MCNC: 0.63 MG/DL (ref 0.4–1.1)
CYTOLOGY CMNT CVX/VAG CYTO-IMP: ABNORMAL
EST. AVERAGE GLUCOSE BLD GHB EST-MCNC: 154 MG/DL
GFR/BSA.PRED SERPLBLD CYS-BASED-ARV: 92 ML/MIN/{1.73_M2}
GLUCOSE SERPL-MCNC: 119 MG/DL (ref 65–99)
HBA1C MFR BLD: 7 %
HDLC SERPL-MCNC: 53 MG/DL (ref 23–92)
LDLC SERPL CALC-MCNC: 166 MG/DL
LDLC SERPL DIRECT ASSAY-MCNC: 178 MG/DL
NONHDLC SERPL-MCNC: 198 MG/DL
POTASSIUM SERPL-SCNC: 4.3 MMOL/L (ref 3.5–5.2)
PROT SERPL-MCNC: 7.4 G/DL (ref 6.3–8.3)
SODIUM SERPL-SCNC: 142 MMOL/L (ref 135–145)
TRIGL SERPL-MCNC: 159 MG/DL

## 2025-01-04 ENCOUNTER — RESULTS FOLLOW-UP (OUTPATIENT)
Dept: FAMILY MEDICINE CLINIC | Facility: CLINIC | Age: 75
End: 2025-01-04

## 2025-01-04 NOTE — PROGRESS NOTES
Procedure Details   - PERIODONTAL MAINTENANCE       3 Month Periodontal Maintenance      REVIEWED MED HX: meds, allergies, health changes reviewed in EPIC  CHIEF CONCERN:  none   PAIN SCALE: 0  ASA CLASS:  ASA 2 - Patient with mild systemic disease with no functional limitations  PLAQUE:  mild  CALCULUS:  Light  BLEEDING:  light  STAIN: None      Hygiene Procedures: Scaled, Polished, Flossed    Oral Hygiene Instruction: Brushing minimum 2x daily for 2 minutes, daily flossing and Recommended soft toothbrush only supper floss for cleaning under bridge    FINDINGS:     See charting (explained the presents of multiple caries and teeth that should be extracted, patient not interested in treatment at this time)    Next Recall: FMX / 3 month perio maintenance/periodic exam    Last BWX: 09/26/24  Last Panorex/FMX : 01/24/22

## 2025-01-04 NOTE — DENTAL PROCEDURE DETAILS
3 Month Periodontal Maintenance      REVIEWED MED HX: meds, allergies, health changes reviewed in EPIC  CHIEF CONCERN:  none   PAIN SCALE: 0  ASA CLASS:  ASA 2 - Patient with mild systemic disease with no functional limitations  PLAQUE:  mild  CALCULUS:  Light  BLEEDING:  light  STAIN: None      Hygiene Procedures: Scaled, Polished, Flossed    Oral Hygiene Instruction: Brushing minimum 2x daily for 2 minutes, daily flossing and Recommended soft toothbrush only supper floss for cleaning under bridge    FINDINGS:     See charting (explained the presents of multiple caries and teeth that should be extracted, patient not interested in treatment at this time)    Next Recall: FMX / 3 month perio maintenance/periodic exam    Last BWX: 09/26/24  Last Panorex/FMX : 01/24/22

## 2025-01-17 ENCOUNTER — OFFICE VISIT (OUTPATIENT)
Dept: FAMILY MEDICINE CLINIC | Facility: CLINIC | Age: 75
End: 2025-01-17
Payer: MEDICARE

## 2025-01-17 VITALS
TEMPERATURE: 97.7 F | SYSTOLIC BLOOD PRESSURE: 140 MMHG | BODY MASS INDEX: 42.63 KG/M2 | HEIGHT: 61 IN | HEART RATE: 100 BPM | OXYGEN SATURATION: 98 % | DIASTOLIC BLOOD PRESSURE: 80 MMHG | WEIGHT: 225.8 LBS

## 2025-01-17 DIAGNOSIS — Z00.00 MEDICARE ANNUAL WELLNESS VISIT, SUBSEQUENT: Primary | ICD-10-CM

## 2025-01-17 DIAGNOSIS — Z13.6 SCREENING FOR HEART DISEASE: ICD-10-CM

## 2025-01-17 DIAGNOSIS — Z78.0 ENCOUNTER FOR OSTEOPOROSIS SCREENING IN ASYMPTOMATIC POSTMENOPAUSAL PATIENT: ICD-10-CM

## 2025-01-17 DIAGNOSIS — Z13.820 ENCOUNTER FOR OSTEOPOROSIS SCREENING IN ASYMPTOMATIC POSTMENOPAUSAL PATIENT: ICD-10-CM

## 2025-01-17 PROCEDURE — G0439 PPPS, SUBSEQ VISIT: HCPCS | Performed by: FAMILY MEDICINE

## 2025-01-17 NOTE — PROGRESS NOTES
Name: Shey Ortiz      : 1950      MRN: [id number]  Encounter Provider: Freya Xavier DO  Encounter Date: 2025   Encounter department: [facility] PRIMARY CARE    Assessment & Plan  Medicare annual wellness visit, subsequent         Encounter for osteoporosis screening in asymptomatic postmenopausal patient    Orders:  •  DXA bone density spine hip and pelvis; Future    Screening for heart disease    Orders:  •  CT coronary calcium score; Future      Depression Screening and Follow-up Plan: Patient was screened for depression during today's encounter. They screened negative with a PHQ-2 score of 0.    Urinary Incontinence Plan of Care: counseling topics discussed: practice Kegel (pelvic floor strengthening) exercises, use restroom every 2 hours and limiting fluid intake 3-4 hours before bed. Discussed.       Preventive health issues were discussed with patient, and age appropriate screening tests were ordered as noted in patient's After Visit Summary. Personalized health advice and appropriate referrals for health education or preventive services given if needed, as noted in patient's After Visit Summary.    History of Present Illness     74-year-old female here for Medicare wellness.       Chief Complaint   Patient presents with   • Medicare Wellness Visit      Patient Care Team:  Freya Xavier DO as PCP - General (Family Medicine)    Review of Systems   HENT:  Negative for dental problem (Follows with dentistry clinic).    Gastrointestinal:         SIBO?   Genitourinary:         Patient reports small amount of bladder leakage.   Skin:         ?psoriasis   All other systems reviewed and are negative.    Making her own cultured dairy for probiotic for over a year for SIBO   Will be following with DERM-- ?? Psoriasis, using nizoral shampoo  Annual Wellness Visit Questionnaire   Shey is here for her Subsequent Wellness visit.     Health Risk Assessment:   Patient rates overall  health as good. Patient feels that their physical health rating is same. Patient is dissatisfied with their life. Eyesight was rated as same. Hearing was rated as slightly better. Patient feels that their emotional and mental health rating is same. Patients states they are never, rarely angry. Patient states they are never, rarely unusually tired/fatigued. Pain experienced in the last 7 days has been none. Patient states that she has experienced no weight loss or gain in last 6 months.     Depression Screening:   PHQ-2 Score: 0      Fall Risk Screening:   In the past year, patient has experienced: no history of falling in past year      Urinary Incontinence Screening:   Patient has leaked urine accidently in the last six months.     Home Safety:  Patient has working smoke alarms and has no working carbon monoxide detector. Home safety hazards include: none.     Nutrition:   Current diet is Regular.     Medications:   Patient is currently taking over-the-counter supplements. OTC medications include: see medication list. Patient is able to manage medications.     Activities of Daily Living (ADLs)/Instrumental Activities of Daily Living (IADLs):   Walk and transfer into and out of bed and chair?: Yes  Dress and groom yourself?: Yes    Bathe or shower yourself?: Yes    Feed yourself? Yes  Do your laundry/housekeeping?: Yes  Manage your money, pay your bills and track your expenses?: Yes  Make your own meals?: Yes    Do your own shopping?: Yes    Advance Care Planning:   Living will: Yes    Durable POA for healthcare: Yes    Advanced directive: Yes      PREVENTIVE SCREENINGS      Cardiovascular Screening:    General: Screening Current      Diabetes Screening:     General: Screening Current      Cervical Cancer Screening:    General: Screening Not Indicated      Lung Cancer Screening:     General: Screening Not Indicated    Screening, Brief Intervention, and Referral to Treatment (SBIRT)    Screening  Typical number of  "drinks in a day: 0  Typical number of drinks in a week: 0  Interpretation: Low risk drinking behavior.    Social Drivers of Health     Financial Resource Strain: Low Risk  (1/12/2024)    Overall Financial Resource Strain (CARDIA)    • Difficulty of Paying Living Expenses: Not hard at all   Food Insecurity: No Food Insecurity (1/17/2025)    Hunger Vital Sign    • Worried About Running Out of Food in the Last Year: Never true    • Ran Out of Food in the Last Year: Never true   Transportation Needs: No Transportation Needs (1/17/2025)    PRAPARE - Transportation    • Lack of Transportation (Medical): No    • Lack of Transportation (Non-Medical): No   Housing Stability: Low Risk  (1/17/2025)    Housing Stability Vital Sign    • Unable to Pay for Housing in the Last Year: No    • Number of Times Moved in the Last Year: 0    • Homeless in the Last Year: No   Utilities: Not At Risk (1/17/2025)    [facility] Utilities    • Threatened with loss of utilities: No     No results found.    Objective   /80   Pulse 100   Temp 97.7 °F (36.5 °C) (Temporal)   Ht 5' 1\" (1.549 m)   Wt 102 kg (225 lb 12.8 oz)   SpO2 98%   BMI 42.66 kg/m²     Physical Exam  Constitutional:       General: She is not in acute distress.     Appearance: Normal appearance. She is well-developed.      Comments: 74-year-old female appears younger than stated age   HENT:      Head: Normocephalic.      Right Ear: Tympanic membrane normal.      Left Ear: Tympanic membrane normal.      Nose: Nose normal.      Mouth/Throat:      Mouth: Mucous membranes are moist.   Eyes:      Conjunctiva/sclera: Conjunctivae normal.      Pupils: Pupils are equal, round, and reactive to light.   Neck:      Vascular: No carotid bruit.   Cardiovascular:      Rate and Rhythm: Normal rate and regular rhythm.      Pulses: Normal pulses.      Heart sounds: Normal heart sounds. No murmur heard.     No friction rub. No gallop.   Pulmonary:      Effort: Pulmonary effort is normal.      " Breath sounds: Normal breath sounds.   Abdominal:      General: Bowel sounds are normal.      Palpations: Abdomen is soft. There is no mass.      Tenderness: There is no abdominal tenderness.   Musculoskeletal:         General: Normal range of motion.   Skin:     Findings: No rash.   Neurological:      Mental Status: She is alert and oriented to person, place, and time.      Deep Tendon Reflexes: Reflexes are normal and symmetric.   Psychiatric:         Mood and Affect: Mood normal.         Behavior: Behavior normal.         Thought Content: Thought content normal.         Judgment: Judgment normal.

## 2025-01-17 NOTE — PATIENT INSTRUCTIONS
Medicare Preventive Visit Patient Instructions  Thank you for completing your Welcome to Medicare Visit or Medicare Annual Wellness Visit today. Your next wellness visit will be due in one year (1/18/2026).  The screening/preventive services that you may require over the next 5-10 years are detailed below. Some tests may not apply to you based off risk factors and/or age. Screening tests ordered at today's visit but not completed yet may show as past due. Also, please note that scanned in results may not display below.  Preventive Screenings:  Service Recommendations Previous Testing/Comments   Colorectal Cancer Screening  * Colonoscopy    * Fecal Occult Blood Test (FOBT)/Fecal Immunochemical Test (FIT)  * Fecal DNA/Cologuard Test  * Flexible Sigmoidoscopy Age: 45-75 years old   Colonoscopy: every 10 years (may be performed more frequently if at higher risk)  OR  FOBT/FIT: every 1 year  OR  Cologuard: every 3 years  OR  Sigmoidoscopy: every 5 years  Screening may be recommended earlier than age 45 if at higher risk for colorectal cancer. Also, an individualized decision between you and your healthcare provider will decide whether screening between the ages of 76-85 would be appropriate. Colonoscopy: Not on file  FOBT/FIT: Not on file  Cologuard: 03/14/2023  Sigmoidoscopy: Not on file          Breast Cancer Screening Age: 40+ years old  Frequency: every 1-2 years  Not required if history of left and right mastectomy Mammogram: Not on file        Cervical Cancer Screening Between the ages of 21-29, pap smear recommended once every 3 years.   Between the ages of 30-65, can perform pap smear with HPV co-testing every 5 years.   Recommendations may differ for women with a history of total hysterectomy, cervical cancer, or abnormal pap smears in past. Pap Smear: 12/11/2017    Screening Not Indicated   Hepatitis C Screening Once for adults born between 1945 and 1965  More frequently in patients at high risk for Hepatitis C  Hep C Antibody: Not on file        Diabetes Screening 1-2 times per year if you're at risk for diabetes or have pre-diabetes Fasting glucose: No results in last 5 years (No results in last 5 years)  A1C: 7.0 % (1/3/2025)  Screening Current   Cholesterol Screening Once every 5 years if you don't have a lipid disorder. May order more often based on risk factors. Lipid panel: 01/03/2025    Screening Current     Other Preventive Screenings Covered by Medicare:  Abdominal Aortic Aneurysm (AAA) Screening: covered once if your at risk. You're considered to be at risk if you have a family history of AAA.  Lung Cancer Screening: covers low dose CT scan once per year if you meet all of the following conditions: (1) Age 55-77; (2) No signs or symptoms of lung cancer; (3) Current smoker or have quit smoking within the last 15 years; (4) You have a tobacco smoking history of at least 20 pack years (packs per day multiplied by number of years you smoked); (5) You get a written order from a healthcare provider.  Glaucoma Screening: covered annually if you're considered high risk: (1) You have diabetes OR (2) Family history of glaucoma OR (3)  aged 50 and older OR (4)  American aged 65 and older  Osteoporosis Screening: covered every 2 years if you meet one of the following conditions: (1) You're estrogen deficient and at risk for osteoporosis based off medical history and other findings; (2) Have a vertebral abnormality; (3) On glucocorticoid therapy for more than 3 months; (4) Have primary hyperparathyroidism; (5) On osteoporosis medications and need to assess response to drug therapy.   Last bone density test (DXA Scan): Not on file.  HIV Screening: covered annually if you're between the age of 15-65. Also covered annually if you are younger than 15 and older than 65 with risk factors for HIV infection. For pregnant patients, it is covered up to 3 times per pregnancy.    Immunizations:  Immunization  Recommendations   Influenza Vaccine Annual influenza vaccination during flu season is recommended for all persons aged >= 6 months who do not have contraindications   Pneumococcal Vaccine   * Pneumococcal conjugate vaccine = PCV13 (Prevnar 13), PCV15 (Vaxneuvance), PCV20 (Prevnar 20)  * Pneumococcal polysaccharide vaccine = PPSV23 (Pneumovax) Adults 19-65 yo with certain risk factors or if 65+ yo  If never received any pneumonia vaccine: recommend Prevnar 20 (PCV20)  Give PCV20 if previously received 1 dose of PCV13 or PPSV23   Hepatitis B Vaccine 3 dose series if at intermediate or high risk (ex: diabetes, end stage renal disease, liver disease)   Respiratory syncytial virus (RSV) Vaccine - COVERED BY MEDICARE PART D  * RSVPreF3 (Arexvy) CDC recommends that adults 60 years of age and older may receive a single dose of RSV vaccine using shared clinical decision-making (SCDM)   Tetanus (Td) Vaccine - COST NOT COVERED BY MEDICARE PART B Following completion of primary series, a booster dose should be given every 10 years to maintain immunity against tetanus. Td may also be given as tetanus wound prophylaxis.   Tdap Vaccine - COST NOT COVERED BY MEDICARE PART B Recommended at least once for all adults. For pregnant patients, recommended with each pregnancy.   Shingles Vaccine (Shingrix) - COST NOT COVERED BY MEDICARE PART B  2 shot series recommended in those 19 years and older who have or will have weakened immune systems or those 50 years and older     Health Maintenance Due:      Topic Date Due   • Hepatitis C Screening  Never done   • Colorectal Cancer Screening  03/14/2026     Immunizations Due:      Topic Date Due   • Influenza Vaccine (1) Never done   • COVID-19 Vaccine (1 - 2024-25 season) Never done     Advance Directives   What are advance directives?  Advance directives are legal documents that state your wishes and plans for medical care. These plans are made ahead of time in case you lose your ability to  make decisions for yourself. Advance directives can apply to any medical decision, such as the treatments you want, and if you want to donate organs.   What are the types of advance directives?  There are many types of advance directives, and each state has rules about how to use them. You may choose a combination of any of the following:  Living will:  This is a written record of the treatment you want. You can also choose which treatments you do not want, which to limit, and which to stop at a certain time. This includes surgery, medicine, IV fluid, and tube feedings.   Durable power of  for healthcare (DPAHC):  This is a written record that states who you want to make healthcare choices for you when you are unable to make them for yourself. This person, called a proxy, is usually a family member or a friend. You may choose more than 1 proxy.  Do not resuscitate (DNR) order:  A DNR order is used in case your heart stops beating or you stop breathing. It is a request not to have certain forms of treatment, such as CPR. A DNR order may be included in other types of advance directives.  Medical directive:  This covers the care that you want if you are in a coma, near death, or unable to make decisions for yourself. You can list the treatments you want for each condition. Treatment may include pain medicine, surgery, blood transfusions, dialysis, IV or tube feedings, and a ventilator (breathing machine).  Values history:  This document has questions about your views, beliefs, and how you feel and think about life. This information can help others choose the care that you would choose.  Why are advance directives important?  An advance directive helps you control your care. Although spoken wishes may be used, it is better to have your wishes written down. Spoken wishes can be misunderstood, or not followed. Treatments may be given even if you do not want them. An advance directive may make it easier for your  family to make difficult choices about your care.   Urinary Incontinence   Urinary incontinence (UI)  is when you lose control of your bladder. UI develops because your bladder cannot store or empty urine properly. The 3 most common types of UI are stress incontinence, urge incontinence, or both.  Medicines:   May be given to help strengthen your bladder control. Report any side effects of medication to your healthcare provider.  Do pelvic muscle exercises often:  Your pelvic muscles help you stop urinating. Squeeze these muscles tight for 5 seconds, then relax for 5 seconds. Gradually work up to squeezing for 10 seconds. Do 3 sets of 15 repetitions a day, or as directed. This will help strengthen your pelvic muscles and improve bladder control.  Train your bladder:  Go to the bathroom at set times, such as every 2 hours, even if you do not feel the urge to go. You can also try to hold your urine when you feel the urge to go. For example, hold your urine for 5 minutes when you feel the urge to go. As that becomes easier, hold your urine for 10 minutes.   Self-care:   Keep a UI record.  Write down how often you leak urine and how much you leak. Make a note of what you were doing when you leaked urine.  Drink liquids as directed. You may need to limit the amount of liquid you drink to help control your urine leakage. Do not drink any liquid right before you go to bed. Limit or do not have drinks that contain caffeine or alcohol.   Prevent constipation.  Eat a variety of high-fiber foods. Good examples are high-fiber cereals, beans, vegetables, and whole-grain breads. Walking is the best way to trigger your intestines to have a bowel movement.  Exercise regularly and maintain a healthy weight.  Weight loss and exercise will decrease pressure on your bladder and help you control your leakage.   Use a catheter as directed  to help empty your bladder. A catheter is a tiny, plastic tube that is put into your bladder to  drain your urine.   Go to behavior therapy as directed.  Behavior therapy may be used to help you learn to control your urge to urinate.    Weight Management   Why it is important to manage your weight:  Being overweight increases your risk of health conditions such as heart disease, high blood pressure, type 2 diabetes, and certain types of cancer. It can also increase your risk for osteoarthritis, sleep apnea, and other respiratory problems. Aim for a slow, steady weight loss. Even a small amount of weight loss can lower your risk of health problems.  How to lose weight safely:  A safe and healthy way to lose weight is to eat fewer calories and get regular exercise. You can lose up about 1 pound a week by decreasing the number of calories you eat by 500 calories each day.   Healthy meal plan for weight management:  A healthy meal plan includes a variety of foods, contains fewer calories, and helps you stay healthy. A healthy meal plan includes the following:  Eat whole-grain foods more often.  A healthy meal plan should contain fiber. Fiber is the part of grains, fruits, and vegetables that is not broken down by your body. Whole-grain foods are healthy and provide extra fiber in your diet. Some examples of whole-grain foods are whole-wheat breads and pastas, oatmeal, brown rice, and bulgur.  Eat a variety of vegetables every day.  Include dark, leafy greens such as spinach, kale, ti greens, and mustard greens. Eat yellow and orange vegetables such as carrots, sweet potatoes, and winter squash.   Eat a variety of fruits every day.  Choose fresh or canned fruit (canned in its own juice or light syrup) instead of juice. Fruit juice has very little or no fiber.  Eat low-fat dairy foods.  Drink fat-free (skim) milk or 1% milk. Eat fat-free yogurt and low-fat cottage cheese. Try low-fat cheeses such as mozzarella and other reduced-fat cheeses.  Choose meat and other protein foods that are low in fat.  Choose beans  or other legumes such as split peas or lentils. Choose fish, skinless poultry (chicken or turkey), or lean cuts of red meat (beef or pork). Before you cook meat or poultry, cut off any visible fat.   Use less fat and oil.  Try baking foods instead of frying them. Add less fat, such as margarine, sour cream, regular salad dressing and mayonnaise to foods. Eat fewer high-fat foods. Some examples of high-fat foods include french fries, doughnuts, ice cream, and cakes.  Eat fewer sweets.  Limit foods and drinks that are high in sugar. This includes candy, cookies, regular soda, and sweetened drinks.  Exercise:  Exercise at least 30 minutes per day on most days of the week. Some examples of exercise include walking, biking, dancing, and swimming. You can also fit in more physical activity by taking the stairs instead of the elevator or parking farther away from stores. Ask your healthcare provider about the best exercise plan for you.      © Copyright [employer]Â® 2018 Information is for End User's use only and may not be sold, redistributed or otherwise used for commercial purposes. All illustrations and images included in CareNotes® are the copyrighted property of [employer] or [employer]

## 2025-02-27 ENCOUNTER — HOSPITAL ENCOUNTER (OUTPATIENT)
Dept: CT IMAGING | Facility: HOSPITAL | Age: 75
Discharge: HOME | End: 2025-02-27
Payer: COMMERCIAL

## 2025-02-27 DIAGNOSIS — Z13.6 SCREENING FOR HEART DISEASE: ICD-10-CM

## 2025-02-27 PROCEDURE — 75571 CT HRT W/O DYE W/CA TEST: CPT

## 2025-03-08 ENCOUNTER — RESULTS FOLLOW-UP (OUTPATIENT)
Dept: FAMILY MEDICINE CLINIC | Facility: CLINIC | Age: 75
End: 2025-03-08

## 2025-03-09 NOTE — RESULT ENCOUNTER NOTE
Patient's CT coronary calcium test shows some significant findings with a score of 83.  And when calculating her 10-year cardiovascular risk score taking into account her family history, her newly diagnosed type 2 diabetes and her very high cholesterol levels she has a 15.6% risk of having a coronary event event or stroke within the next 10 years.  This calculation actually makes her coronary age more like a 89-year-old.  She needs aggressive intervention including statin therapy and treatment for her diabetes.  She should see a cardiologist and probably an endocrinologist.  Appropriate referrals should be initiated.  I do know that she prefers to go the natural route but I do not think that is appropriate or safe

## 2025-03-18 ENCOUNTER — HOSPITAL ENCOUNTER (OUTPATIENT)
Dept: BONE DENSITY | Facility: MEDICAL CENTER | Age: 75
Discharge: HOME | End: 2025-03-18
Payer: MEDICARE

## 2025-03-18 VITALS — HEIGHT: 61 IN | WEIGHT: 225 LBS | BODY MASS INDEX: 42.48 KG/M2

## 2025-03-18 DIAGNOSIS — Z13.820 ENCOUNTER FOR OSTEOPOROSIS SCREENING IN ASYMPTOMATIC POSTMENOPAUSAL PATIENT: ICD-10-CM

## 2025-03-18 DIAGNOSIS — Z78.0 ENCOUNTER FOR OSTEOPOROSIS SCREENING IN ASYMPTOMATIC POSTMENOPAUSAL PATIENT: ICD-10-CM

## 2025-03-18 PROCEDURE — 77080 DXA BONE DENSITY AXIAL: CPT

## 2025-04-10 ENCOUNTER — OFFICE VISIT (OUTPATIENT)
Dept: DENTISTRY | Facility: CLINIC | Age: 75
End: 2025-04-10

## 2025-04-10 VITALS — TEMPERATURE: 97.7 F | HEART RATE: 72 BPM | DIASTOLIC BLOOD PRESSURE: 81 MMHG | SYSTOLIC BLOOD PRESSURE: 160 MMHG

## 2025-04-10 DIAGNOSIS — K05.6 PERIODONTAL DISEASE: Primary | ICD-10-CM

## 2025-04-10 PROCEDURE — D4910 PERIODONTAL MAINTENANCE: HCPCS | Performed by: DENTAL HYGIENIST

## 2025-04-10 PROCEDURE — D0120 PERIODIC ORAL EVALUATION - ESTABLISHED PATIENT: HCPCS

## 2025-04-10 NOTE — PROGRESS NOTES
Periodic Exam and 3 Month Periodontal Maintenance, [facility]     REVIEWED MED HX: meds, allergies, health changes reviewed in EPIC  CHIEF CONCERN:  none   PAIN SCALE:  0  ASA CLASS:  ASA 2 - Patient with mild systemic disease with no functional limitations  PLAQUE:  mild;  Mod food debris interproximal maxillary teeth  CALCULUS:  Light  BLEEDING:  none  STAIN :   None  PERIO:   Mild to mod bone loss and recession  ---FMP - 1-5 mm no BUP  ---Stage 2, Grade B  ---3 month perio main recommended    Hygiene Procedures: Scaled, Polished, Flossed and Used Cavitron    Oral Hygiene Instruction: Brushing Minimum 2x daily for 2 minutes, daily flossing, Interproximal Brush, Listerine, and Recommended soft toothbrush only    Visual and Tactile Intraoral/ Extraoral evaluation: Oral and Oropharyngeal cancer evaluation. No findings     REFERRALS: none    EXAM: Dr. Solitario    CARIES FINDINGS:  see tooth chart  ---Pt Ok with coming back to restore 17 - O.  ---Pt does not want to fix the decay on the UR teeth at this time - recommended SDF application to help slow the progression of decay.  ---Pt stated she is brushing with Xylitol toothpaste.    Next Recall:   NV1:  Rest 17 - O and SDF application on several teeth - 60 min  NV2:  Perio main - 50 min - 3 months from 4/10/25    Last BWX:  9/26/24  Last Panorex:   Last FMX :   1/24/22

## 2025-05-15 DIAGNOSIS — R73.03 PRE-DIABETES: Primary | ICD-10-CM

## 2025-05-15 DIAGNOSIS — E78.2 MIXED HYPERLIPIDEMIA: ICD-10-CM

## 2025-05-15 NOTE — TELEPHONE ENCOUNTER
----- Message from Freya Xavier DO sent at 5/15/2025 10:06 AM EDT -----  DEXA scan reviewed.  This is a baseline, she does have osteoporosis score both at femur and approaching osteoporosis score at lumbar.  Important to maintain weightbearing exercises like walking,   appropriate calcium and vitamin D intake, balance and fall precautions.  Since this is a baseline, would repeat in 2 years as I suspect the patient would probably not be interested in pursuing   pharmacological treatment  ----- Message -----  From: Yesenia, Radiology Results In  Sent: 3/22/2025  11:35 AM EDT  To: Freya Xavier DO

## 2025-05-15 NOTE — RESULT ENCOUNTER NOTE
DEXA scan reviewed.  This is a baseline, she does have osteoporosis score both at femur and approaching osteoporosis score at lumbar.  Important to maintain weightbearing exercises like walking, appropriate calcium and vitamin D intake, balance and fall precautions.  Since this is a baseline, would repeat in 2 years as I suspect the patient would probably not be interested in pursuing pharmacological treatment

## 2025-05-15 NOTE — RESULT ENCOUNTER NOTE
Definitely can recheck the hemoglobin A1c and maybe the lipid panel as well to see if the cholesterol has an triglycerides have improved I will write the orders

## 2025-05-15 NOTE — TELEPHONE ENCOUNTER
Spoke w/ pt and gave results and recommendations. Pt states she get adequate calcium and Vitamin D and exercise. Pt states she lost about 20 pounds and cut out sweets from her diet. Pt is wondering if you would want to have her Hemoglobin A1c rechecked to see where she is at now? Please advise. Thanks

## 2025-05-15 NOTE — TELEPHONE ENCOUNTER
----- Message from Freya Xavier DO sent at 5/15/2025  1:02 PM EDT -----  Definitely can recheck the hemoglobin A1c and maybe the lipid panel to see if the cholesterol and triglycerides have improved also.  I will put the order in  ----- Message -----  From: April Gilbert MA  Sent: 5/15/2025  12:58 PM EDT  To: Freya Xavier DO    ----- Message from April Gilbert MA sent at 5/15/2025 12:58 PM EDT -----      ----- Message -----  From: Freya Xavier DO  Sent: 5/15/2025  10:06 AM EDT  To: [facility]    DEXA scan reviewed.  This is a baseline, she does have osteoporosis score both at femur and approaching osteoporosis score at lumbar.  Important to maintain weightbearing exercises like walking,   appropriate calcium and vitamin D intake, balance and fall precautions.  Since this is a baseline, would repeat in 2 years as I suspect the patient would probably not be interested in pursuing   pharmacological treatment  ----- Message -----  From: Interface, Radiology Results In  Sent: 3/22/2025  11:35 AM EDT  To: Freya Xavier DO

## 2025-05-15 NOTE — RESULT ENCOUNTER NOTE
Definitely can recheck the hemoglobin A1c and maybe the lipid panel to see if the cholesterol and triglycerides have improved also.  I will put the order in